# Patient Record
Sex: FEMALE | Race: WHITE | NOT HISPANIC OR LATINO | Employment: OTHER | URBAN - METROPOLITAN AREA
[De-identification: names, ages, dates, MRNs, and addresses within clinical notes are randomized per-mention and may not be internally consistent; named-entity substitution may affect disease eponyms.]

---

## 2018-06-04 ENCOUNTER — HOSPITAL ENCOUNTER (EMERGENCY)
Facility: HOSPITAL | Age: 68
Discharge: HOME | End: 2018-06-04
Payer: MEDICARE

## 2018-06-04 VITALS
HEART RATE: 66 BPM | SYSTOLIC BLOOD PRESSURE: 163 MMHG | TEMPERATURE: 98 F | BODY MASS INDEX: 22.33 KG/M2 | OXYGEN SATURATION: 96 % | RESPIRATION RATE: 18 BRPM | DIASTOLIC BLOOD PRESSURE: 92 MMHG | HEIGHT: 65 IN | WEIGHT: 134 LBS

## 2018-06-04 DIAGNOSIS — F41.9 ANXIETY: ICD-10-CM

## 2018-06-04 DIAGNOSIS — R42 DIZZINESS: Primary | ICD-10-CM

## 2018-06-04 DIAGNOSIS — R51.9 NONINTRACTABLE HEADACHE, UNSPECIFIED CHRONICITY PATTERN, UNSPECIFIED HEADACHE TYPE: ICD-10-CM

## 2018-06-04 DIAGNOSIS — R03.0 ELEVATED BLOOD PRESSURE READING: ICD-10-CM

## 2018-06-04 LAB
BACTERIA #/AREA URNS HPF: NORMAL /HPF
BILIRUB UR QL STRIP: NEGATIVE
CLARITY UR: CLEAR
COLOR UR: YELLOW
GLUCOSE SERPL-MCNC: 79 MG/DL (ref 70–110)
GLUCOSE UR QL STRIP: NEGATIVE
HGB UR QL STRIP: ABNORMAL
KETONES UR QL STRIP: NEGATIVE
LEUKOCYTE ESTERASE UR QL STRIP: NEGATIVE
MICROSCOPIC COMMENT: NORMAL
NITRITE UR QL STRIP: NEGATIVE
PH UR STRIP: 6 [PH] (ref 5–8)
POCT GLUCOSE: 79 MG/DL (ref 70–110)
PROT UR QL STRIP: NEGATIVE
RBC #/AREA URNS HPF: 3 /HPF (ref 0–4)
SP GR UR STRIP: 1.02 (ref 1–1.03)
SQUAMOUS #/AREA URNS HPF: 3 /HPF
URN SPEC COLLECT METH UR: ABNORMAL
UROBILINOGEN UR STRIP-ACNC: NEGATIVE EU/DL
WBC #/AREA URNS HPF: 3 /HPF (ref 0–5)

## 2018-06-04 PROCEDURE — 87086 URINE CULTURE/COLONY COUNT: CPT

## 2018-06-04 PROCEDURE — 99284 EMERGENCY DEPT VISIT MOD MDM: CPT | Mod: 25

## 2018-06-04 PROCEDURE — 93010 ELECTROCARDIOGRAM REPORT: CPT | Mod: ,,, | Performed by: INTERNAL MEDICINE

## 2018-06-04 PROCEDURE — 81000 URINALYSIS NONAUTO W/SCOPE: CPT

## 2018-06-04 RX ORDER — CALCIUM CARBONATE 600 MG
600 TABLET ORAL ONCE
Status: ON HOLD | COMMUNITY
End: 2021-09-28 | Stop reason: SDUPTHER

## 2018-06-04 RX ORDER — LOSARTAN POTASSIUM 50 MG/1
50 TABLET ORAL DAILY
COMMUNITY

## 2018-06-04 RX ORDER — AMLODIPINE BESYLATE 5 MG/1
5 TABLET ORAL DAILY
COMMUNITY

## 2018-06-04 NOTE — ED PROVIDER NOTES
"SCRIBE #1 NOTE: I, Yuejovana Mcgill, am scribing for, and in the presence of, Lilliam Morales PA-C. I have scribed the entire note.      History      Chief Complaint   Patient presents with    Dizziness     Dizziness with HA that started last night.  Recently started amlodipine last month & states dizziness started around that time. Spouse states that pt is not eating like she should.        Review of patient's allergies indicates:   Allergen Reactions    Iodine and iodide containing products         HPI   HPI    6/4/2018, 5:29 PM   History obtained from the patient and spouse      History of Present Illness: Michela Arriaza is a 68 y.o. female patient who presents to the Emergency Department for worsening chronic dizziness which onset gradually a couple months ago after being started on amlodipine. Symptoms are intermittent and moderate in severity. Sxs exacerbated with standing up. No other mitigating or exacerbating factors reported. Associated sxs include HA which onset last night. Pt spouse reports that the pt "does not eat enough". Patient denies any ear pain, fever, chills, head injury/trauma, LOC, neck pain/stiffness, visual disturbance/photophobia, extremity weakness/numbness, SOB, CP, and all other sxs at this time. Pt has an appointment with a Neurologist in 3 weeks. Pt is requesting a CT scan of her head. No further complaints or concerns at this time.         Arrival mode: Personal vehicle    PCP: Yoni Doran MD   [facility]       Past Medical History:  Past Medical History:   Diagnosis Date    Hyperlipidemia          Past Surgical History:  Past surgical history reviewed not relevant      Family History:  Family history reviewed not relevant        Social History:  Social History     Social History Main Topics    Smoking status: Former Smoker    Smokeless tobacco: Unknown    Alcohol use No    Drug use: No    Sexual activity: Yes     Partners: Male       ROS "   Review of Systems   Constitutional: Positive for appetite change. Negative for chills and fever.   HENT: Negative for sore throat.    Eyes: Negative for photophobia and visual disturbance.   Respiratory: Negative for shortness of breath.    Cardiovascular: Negative for chest pain.   Gastrointestinal: Negative for nausea.   Genitourinary: Negative for dysuria.   Musculoskeletal: Negative for back pain, neck pain and neck stiffness.   Skin: Negative for rash.   Neurological: Positive for dizziness and headaches. Negative for weakness and numbness.        (-) head injury/trauma (-) LOC   Hematological: Does not bruise/bleed easily.   All other systems reviewed and are negative.      Physical Exam      Initial Vitals [06/04/18 1650]   BP Pulse Resp Temp SpO2   124/80 74 18 99.3 °F (37.4 °C) 96 %      MAP       94.67          Physical Exam  Nursing Notes and Vital Signs Reviewed.  Constitutional: Patient is in no acute distress. Well-developed and well-nourished.  Head: Atraumatic. Normocephalic.  Eyes: PERRL. EOM intact. Conjunctivae are not pale. No scleral icterus.  ENT: Mucous membranes are moist. Oropharynx is clear and symmetric.    Neck: Supple. Full ROM. No lymphadenopathy.  Cardiovascular: Regular rate. Regular rhythm. No murmurs, rubs, or gallops. Distal pulses are 2+ and symmetric.  Pulmonary/Chest: No respiratory distress. Clear to auscultation bilaterally. No wheezing or rales.  Abdominal: Soft and non-distended.  There is no tenderness.  No rebound, guarding, or rigidity.   Musculoskeletal: Moves all extremities. No obvious deformities. No edema. No calf tenderness.  Skin: Warm and dry.  Neurological: Patient is alert and oriented to person, place and time. Pupils ERRL and EOM normal. Negative Rhomberg's test. Negative Gray-Hallpike test. Cranial nerves II-XII are intact. Strength is full bilaterally; it is equal and 5/5 in bilateral upper and lower extremities. There is no pronator drift of outstretched  "arms. Light touch sense is intact. Speech is clear and normal. No acute focal neurological deficits noted.  Psychiatric: Normal affect. Good eye contact. Appropriate in content. Anxious    ED Course    Procedures  ED Vital Signs:  Vitals:    06/04/18 1650 06/04/18 1840 06/04/18 1842 06/04/18 1846   BP: 124/80 (!) 147/96 (!) 149/90 (!) 163/92   Pulse: 74 61 (!) 59 66   Resp: 18      Temp: 99.3 °F (37.4 °C)      TempSrc: Oral      SpO2: 96%      Weight: 60.8 kg (134 lb)      Height: 5' 5" (1.651 m)       06/04/18 1922   BP:    Pulse:    Resp:    Temp: 98.1 °F (36.7 °C)   TempSrc: Oral   SpO2:    Weight:    Height:        Abnormal Lab Results:  Labs Reviewed   URINALYSIS - Abnormal; Notable for the following:        Result Value    Occult Blood UA 1+ (*)     All other components within normal limits   POCT GLUCOSE MONITORING CONTINUOUS - Normal   CULTURE, URINE   CULTURE, URINE   URINALYSIS MICROSCOPIC   POCT GLUCOSE        All Lab Results:  Results for orders placed or performed during the hospital encounter of 06/04/18   Urinalysis Clean Catch   Result Value Ref Range    Specimen UA Urine, Clean Catch     Color, UA Yellow Yellow, Straw, Divina    Appearance, UA Clear Clear    pH, UA 6.0 5.0 - 8.0    Specific Gravity, UA 1.025 1.005 - 1.030    Protein, UA Negative Negative    Glucose, UA Negative Negative    Ketones, UA Negative Negative    Bilirubin (UA) Negative Negative    Occult Blood UA 1+ (A) Negative    Nitrite, UA Negative Negative    Urobilinogen, UA Negative <2.0 EU/dL    Leukocytes, UA Negative Negative   Urinalysis Microscopic   Result Value Ref Range    RBC, UA 3 0 - 4 /hpf    WBC, UA 3 0 - 5 /hpf    Bacteria, UA None None-Occ /hpf    Squam Epithel, UA 3 /hpf    Microscopic Comment SEE COMMENT    POCT glucose   Result Value Ref Range    POC Glucose 79 70 - 110 mg/dL   POCT glucose   Result Value Ref Range    POCT Glucose 79 70 - 110 mg/dL         Imaging Results:  Imaging Results          CT Head Without " Contrast (Final result)  Result time 06/04/18 18:40:23    Final result by Michel Joy MD (06/04/18 18:40:23)                 Impression:      No acute intracranial findings.  Extensive low-density white matter changes which are nonspecific but may be related to chronic small vessel ischemia.    All CT scans at this facility use dose modulation, iterative reconstruction, and/or weight based dosing when appropriate to reduce radiation dose to as low as reasonably achievable.      Electronically signed by: Michel Joy MD  Date:    06/04/2018  Time:    18:40             Narrative:    EXAMINATION:  CT HEAD WITHOUT CONTRAST    CLINICAL HISTORY:  Dizziness;    FINDINGS:  No intracranial hemorrhage or acute findings are demonstrated.  Extensive low-density changes noted throughout the white matter which may be on the basis of chronic small vessel ischemia.  The visualized paranasal sinuses are clear. The calvarium is intact.                               The EKG was ordered, reviewed, and independently interpreted by the ED provider.  Interpretation time: 1825  Rate: 60 BPM  Rhythm: normal sinus rhythm  Interpretation: Nonspecific ST abnormality. No STEMI.           The Emergency Provider reviewed the vital signs and test results, which are outlined above.    ED Discussion     7:17 PM: Reassessed pt at this time.  Pt is awake, alert, and in NAD at this time. Discussed with pt and spouse all pertinent ED information and results. Discussed pt dx and plan of tx. Gave pt and spouse all f/u and return to the ED instructions. All questions and concerns were addressed at this time. Pt and spouse express understanding of information and instructions, and is comfortable with plan to discharge. Pt is stable for discharge.      I discussed with patient and/or family/caretaker that evaluation in the ED does not suggest any emergent or life threatening medical conditions requiring immediate intervention beyond what was provided in  the ED, and I believe patient is safe for discharge.  Regardless, an unremarkable evaluation in the ED does not preclude the development or presence of a serious of life threatening condition. As such, patient was instructed to return immediately for any worsening or change in current symptoms.    Pre-hypertension/Hypertension: The pt has been informed that they may have pre-hypertension or hypertension based on a blood pressure reading in the ED. I recommend that the pt call the PCP listed on their discharge instructions or a physician of their choice this week to arrange f/u for further evaluation of possible pre-hypertension or hypertension.       ED Medication(s):  Medications - No data to display    Discharge Medication List as of 6/4/2018  7:16 PM          Follow-up Information     Yoni Doran MD In 3 days.    Specialty:  Family Medicine  Contact information:  [address] [facility] [address]  [phone]                     Medical Decision Making    Medical Decision Making:   Clinical Tests:   Lab Tests: Ordered and Reviewed  Radiological Study: Ordered and Reviewed  Medical Tests: Ordered and Reviewed           Scribe Attestation:   Scribe #1: I performed the above scribed service and the documentation accurately describes the services I performed. I attest to the accuracy of the note.    Attending:   Physician Attestation Statement for Scribe #1: I, Lilliam Morales PA-C, personally performed the services described in this documentation, as scribed by Yue Mcgill, in my presence, and it is both accurate and complete.          Clinical Impression       ICD-10-CM ICD-9-CM   1. Dizziness R42 780.4   2. Elevated blood pressure reading R03.0 796.2   3. Nonintractable headache, unspecified chronicity pattern, unspecified headache type R51 784.0   4. Anxiety F41.9 300.00       Disposition:   Disposition: Discharged  Condition: Stable         Lilliam Morales  [place]  06/04/18 1144

## 2018-06-05 ENCOUNTER — TELEPHONE (OUTPATIENT)
Dept: NEUROLOGY | Facility: CLINIC | Age: 68
End: 2018-06-05

## 2018-06-05 NOTE — TELEPHONE ENCOUNTER
----- Message from Kenya Man sent at 6/5/2018 12:04 PM CDT -----  Contact: Hank/son  Np needs to come in for an ER follow up, Please call him at [phone].    Thanks  Td

## 2018-06-05 NOTE — TELEPHONE ENCOUNTER
----- Message from Chiquita Lyn sent at 6/5/2018  3:33 PM CDT -----  Contact: Hank son  Returning your call. (Danielle) concerning getting an appointment sooner than July. Please call patient @ [phone]. Thanks, pa

## 2018-06-05 NOTE — ED NOTES
Patient increasingly more disorganized and restless, requires frequent redirection, wandering the ED.

## 2018-06-06 LAB — BACTERIA UR CULT: NORMAL

## 2019-05-13 ENCOUNTER — HOSPITAL ENCOUNTER (EMERGENCY)
Facility: HOSPITAL | Age: 69
Discharge: HOME | End: 2019-05-13
Attending: EMERGENCY MEDICINE
Payer: MEDICARE

## 2019-05-13 VITALS
BODY MASS INDEX: 22.3 KG/M2 | SYSTOLIC BLOOD PRESSURE: 145 MMHG | HEIGHT: 65 IN | OXYGEN SATURATION: 96 % | TEMPERATURE: 98 F | DIASTOLIC BLOOD PRESSURE: 73 MMHG | RESPIRATION RATE: 18 BRPM | HEART RATE: 67 BPM | WEIGHT: 133.81 LBS

## 2019-05-13 DIAGNOSIS — K59.00 CONSTIPATION, UNSPECIFIED CONSTIPATION TYPE: Primary | ICD-10-CM

## 2019-05-13 DIAGNOSIS — K56.41 FECAL IMPACTION IN RECTUM: ICD-10-CM

## 2019-05-13 PROCEDURE — 99283 EMERGENCY DEPT VISIT LOW MDM: CPT

## 2019-05-13 RX ORDER — POLYETHYLENE GLYCOL 3350 17 G/17G
17 POWDER, FOR SOLUTION ORAL DAILY
Qty: 289 G | Refills: 0 | Status: SHIPPED | OUTPATIENT
Start: 2019-05-13

## 2019-05-13 NOTE — ED PROVIDER NOTES
SCRIBE #1 NOTE: I, Luisa Vega, am scribing for, and in the presence of, Nicola Hernández NP. I have scribed the entire note.       History     Chief Complaint   Patient presents with    Diarrhea     Pt reports hard stool x2-3 days and it has now turned very soft and is having pain with BM.     Review of patient's allergies indicates:   Allergen Reactions    Iodine and iodide containing products          History of Present Illness     HPI    5/13/2019, 2:35 PM  History obtained from the patient      History of Present Illness: Michela Arriaza is a 69 y.o. female patient with a PMHx of HLD and HTN who presents to the Emergency Department for evaluation of rectal pain which onset gradually yesterday. Pt states her last normal BM was a week ago. She reports minimal hard stool yesterday. Pt is unsure if she is constipated. Symptoms are constant and moderate in severity. No mitigating or exacerbating factors reported. No associated sxs included. Patient denies any fever, chills, abd pain, hematochezia, n/v/d, bowel incontinence, dysuria, urinary frequency/urgency, hematuria, dizziness, and all other sxs at this time. No prior Tx. No further complaints or concerns at this time.       Arrival mode: Personal vehicle    PCP: Anatoliy Mayberry MD        Past Medical History:  Past Medical History:   Diagnosis Date    Hyperlipidemia     Hypertension        Past Surgical History:  History reviewed. No pertinent surgical history.      Family History:  History reviewed. No pertinent family history.    Social History:  Social History     Tobacco Use    Smoking status: Former Smoker   Substance and Sexual Activity    Alcohol use: No     Alcohol/week: 0.0 oz    Drug use: No    Sexual activity: Yes     Partners: Male        Review of Systems     Review of Systems   Constitutional: Negative for chills and fever.   HENT: Negative for rhinorrhea and sore throat.    Respiratory: Negative for cough and shortness of breath.     Cardiovascular: Negative for chest pain.   Gastrointestinal: Positive for rectal pain. Negative for abdominal pain, blood in stool, diarrhea, nausea and vomiting.        (-) bowel incontinence   Genitourinary: Negative for dysuria, frequency, hematuria and urgency.   Musculoskeletal: Negative for back pain.   Skin: Negative for rash.   Neurological: Negative for dizziness, weakness and numbness.   All other systems reviewed and are negative.       Physical Exam     Initial Vitals [05/13/19 1412]   BP Pulse Resp Temp SpO2   (!) 145/73 67 18 97.9 °F (36.6 °C) 96 %      MAP       --          Physical Exam  Nursing Notes and Vital Signs Reviewed.  Constitutional: Patient is in no acute distress. Well-developed and well-nourished.  Head: Atraumatic. Normocephalic.  Eyes: PERRL. EOM intact. Conjunctivae are not pale. No scleral icterus.  ENT: Mucous membranes are moist. Oropharynx is clear and symmetric.    Neck: Supple. Full ROM. No lymphadenopathy.  Cardiovascular: Regular rate. Regular rhythm. No murmurs, rubs, or gallops. Distal pulses are 2+ and symmetric.  Pulmonary/Chest: No respiratory distress. Clear to auscultation bilaterally. No wheezing or rales.  Abdominal: Soft and non-distended.  There is no tenderness.  No rebound, guarding, or rigidity. Good bowel sounds.  Rectal: Female chaperone present for the duration of the rectal exam. There is no tenderness. No masses or hemorrhoids. Normal sphincter tone. Fecal impaction high in the rectal vault. Pt was manually disimpacted.  Musculoskeletal: Moves all extremities. No obvious deformities. No edema. No calf tenderness.  Skin: Warm and dry.  Neurological:  Alert, awake, and appropriate.  Normal speech.  No acute focal neurological deficits are appreciated.  Psychiatric: Normal affect. Good eye contact. Appropriate in content.     ED Course   Procedures  ED Vital Signs:  Vitals:    05/13/19 1412   BP: (!) 145/73   Pulse: 67   Resp: 18   Temp: 97.9 °F (36.6 °C)  "  TempSrc: Oral   SpO2: 96%   Weight: 60.7 kg (133 lb 13.1 oz)   Height: 5' 5" (1.651 m)       Abnormal Lab Results:  Labs Reviewed - No data to display       Imaging Results:  Imaging Results    None                 The Emergency Provider reviewed the vital signs and test results, which are outlined above.     ED Discussion     3:47 PM: Discussed with pt all pertinent ED information and results. Discussed pt dx and plan of tx. Gave pt all f/u and return to the ED instructions. All questions and concerns were addressed at this time. Pt expresses understanding of information and instructions, and is comfortable with plan to discharge. Pt is stable for discharge.    I discussed with patient and/or family/caretaker that evaluation in the ED does not suggest any emergent or life threatening medical conditions requiring immediate intervention beyond what was provided in the ED, and I believe patient is safe for discharge.  Regardless, an unremarkable evaluation in the ED does not preclude the development or presence of a serious of life threatening condition. As such, patient was instructed to return immediately for any worsening or change in current symptoms.      ED Medication(s):  Medications - No data to display    New Prescriptions    POLYETHYLENE GLYCOL (GLYCOLAX) 17 GRAM/DOSE POWDER    Take 17 g by mouth once daily.       Follow-up Information     Anatoliy Mayberry MD. Schedule an appointment as soon as possible for a visit in 2 days.    Specialty:  Family Medicine  Contact information:  [address] [facility] [address] [phone]                                     Scribe Attestation:   Scribe #1: I performed the above scribed service and the documentation accurately describes the services I performed. I attest to the accuracy of the note.     Attending:   Physician Attestation Statement for Scribe #1: I, Nicola Hernández NP, personally performed the services described in this documentation, as scribed by " Luisa Vega, in my presence, and it is both accurate and complete.           Clinical Impression       ICD-10-CM ICD-9-CM   1. Constipation, unspecified constipation type K59.00 564.00   2. Fecal impaction in rectum K56.41 560.32       Disposition:   Disposition: Discharged  Condition: Stable         Nicola Hernández NP  05/13/19 2708

## 2019-05-13 NOTE — ED NOTES
Bed: 22  Expected date:   Expected time:   Means of arrival:   Comments:  Dirty. devin when clean

## 2021-09-18 ENCOUNTER — HOSPITAL ENCOUNTER (EMERGENCY)
Facility: HOSPITAL | Age: 71
Discharge: HOME | End: 2021-09-19
Attending: EMERGENCY MEDICINE
Payer: MEDICARE

## 2021-09-18 VITALS
OXYGEN SATURATION: 98 % | TEMPERATURE: 99 F | SYSTOLIC BLOOD PRESSURE: 160 MMHG | DIASTOLIC BLOOD PRESSURE: 90 MMHG | BODY MASS INDEX: 24.99 KG/M2 | RESPIRATION RATE: 16 BRPM | HEIGHT: 65 IN | WEIGHT: 150 LBS | HEART RATE: 69 BPM

## 2021-09-18 DIAGNOSIS — N39.0 URINARY TRACT INFECTION WITHOUT HEMATURIA, SITE UNSPECIFIED: Primary | ICD-10-CM

## 2021-09-18 DIAGNOSIS — K57.90 DIVERTICULOSIS: ICD-10-CM

## 2021-09-18 DIAGNOSIS — F03.90 DEMENTIA WITHOUT BEHAVIORAL DISTURBANCE, UNSPECIFIED DEMENTIA TYPE: ICD-10-CM

## 2021-09-18 PROBLEM — R55 VASOVAGAL SYNCOPE: Status: ACTIVE | Noted: 2021-09-18

## 2021-09-18 LAB
ALBUMIN SERPL BCP-MCNC: 3.4 G/DL (ref 3.5–5.2)
ALP SERPL-CCNC: 95 U/L (ref 55–135)
ALT SERPL W/O P-5'-P-CCNC: 16 U/L (ref 10–44)
ANION GAP SERPL CALC-SCNC: 11 MMOL/L (ref 8–16)
AST SERPL-CCNC: 27 U/L (ref 10–40)
BACTERIA #/AREA URNS HPF: ABNORMAL /HPF
BASOPHILS # BLD AUTO: 0.02 K/UL (ref 0–0.2)
BASOPHILS NFR BLD: 0.3 % (ref 0–1.9)
BILIRUB SERPL-MCNC: 0.5 MG/DL (ref 0.1–1)
BILIRUB UR QL STRIP: NEGATIVE
BUN SERPL-MCNC: 15 MG/DL (ref 8–23)
CALCIUM SERPL-MCNC: 9.6 MG/DL (ref 8.7–10.5)
CHLORIDE SERPL-SCNC: 108 MMOL/L (ref 95–110)
CLARITY UR: CLEAR
CO2 SERPL-SCNC: 26 MMOL/L (ref 23–29)
COLOR UR: YELLOW
CREAT SERPL-MCNC: 0.9 MG/DL (ref 0.5–1.4)
DIFFERENTIAL METHOD: NORMAL
EOSINOPHIL # BLD AUTO: 0.2 K/UL (ref 0–0.5)
EOSINOPHIL NFR BLD: 2.3 % (ref 0–8)
ERYTHROCYTE [DISTWIDTH] IN BLOOD BY AUTOMATED COUNT: 13.2 % (ref 11.5–14.5)
EST. GFR  (AFRICAN AMERICAN): >60 ML/MIN/1.73 M^2
EST. GFR  (NON AFRICAN AMERICAN): >60 ML/MIN/1.73 M^2
GLUCOSE SERPL-MCNC: 102 MG/DL (ref 70–110)
GLUCOSE UR QL STRIP: NEGATIVE
HCT VFR BLD AUTO: 42.8 % (ref 37–48.5)
HCV AB SERPL QL IA: NEGATIVE
HGB BLD-MCNC: 14 G/DL (ref 12–16)
HGB UR QL STRIP: ABNORMAL
IMM GRANULOCYTES # BLD AUTO: 0.01 K/UL (ref 0–0.04)
IMM GRANULOCYTES NFR BLD AUTO: 0.2 % (ref 0–0.5)
KETONES UR QL STRIP: NEGATIVE
LEUKOCYTE ESTERASE UR QL STRIP: ABNORMAL
LYMPHOCYTES # BLD AUTO: 1.9 K/UL (ref 1–4.8)
LYMPHOCYTES NFR BLD: 29.6 % (ref 18–48)
MCH RBC QN AUTO: 28.1 PG (ref 27–31)
MCHC RBC AUTO-ENTMCNC: 32.7 G/DL (ref 32–36)
MCV RBC AUTO: 86 FL (ref 82–98)
MICROSCOPIC COMMENT: ABNORMAL
MONOCYTES # BLD AUTO: 0.7 K/UL (ref 0.3–1)
MONOCYTES NFR BLD: 10.4 % (ref 4–15)
NEUTROPHILS # BLD AUTO: 3.7 K/UL (ref 1.8–7.7)
NEUTROPHILS NFR BLD: 57.2 % (ref 38–73)
NITRITE UR QL STRIP: POSITIVE
NRBC BLD-RTO: 0 /100 WBC
PH UR STRIP: 6 [PH] (ref 5–8)
PLATELET # BLD AUTO: 178 K/UL (ref 150–450)
PMV BLD AUTO: 10.4 FL (ref 9.2–12.9)
POTASSIUM SERPL-SCNC: 3.9 MMOL/L (ref 3.5–5.1)
PROT SERPL-MCNC: 6.6 G/DL (ref 6–8.4)
PROT UR QL STRIP: NEGATIVE
RBC # BLD AUTO: 4.98 M/UL (ref 4–5.4)
RBC #/AREA URNS HPF: 0 /HPF (ref 0–4)
SODIUM SERPL-SCNC: 145 MMOL/L (ref 136–145)
SP GR UR STRIP: 1.02 (ref 1–1.03)
URN SPEC COLLECT METH UR: ABNORMAL
UROBILINOGEN UR STRIP-ACNC: NEGATIVE EU/DL
WBC # BLD AUTO: 6.53 K/UL (ref 3.9–12.7)
WBC #/AREA URNS HPF: 5 /HPF (ref 0–5)

## 2021-09-18 PROCEDURE — 81000 URINALYSIS NONAUTO W/SCOPE: CPT | Performed by: NURSE PRACTITIONER

## 2021-09-18 PROCEDURE — 85025 COMPLETE CBC W/AUTO DIFF WBC: CPT | Performed by: NURSE PRACTITIONER

## 2021-09-18 PROCEDURE — 80053 COMPREHEN METABOLIC PANEL: CPT | Performed by: NURSE PRACTITIONER

## 2021-09-18 PROCEDURE — 86803 HEPATITIS C AB TEST: CPT | Performed by: FAMILY MEDICINE

## 2021-09-18 PROCEDURE — 99284 EMERGENCY DEPT VISIT MOD MDM: CPT | Mod: 25

## 2021-09-18 PROCEDURE — 96372 THER/PROPH/DIAG INJ SC/IM: CPT

## 2021-09-18 PROCEDURE — 63600175 PHARM REV CODE 636 W HCPCS: Performed by: EMERGENCY MEDICINE

## 2021-09-18 RX ORDER — ZIPRASIDONE MESYLATE 20 MG/ML
10 INJECTION, POWDER, LYOPHILIZED, FOR SOLUTION INTRAMUSCULAR
Status: COMPLETED | OUTPATIENT
Start: 2021-09-18 | End: 2021-09-18

## 2021-09-18 RX ADMIN — ZIPRASIDONE MESYLATE 10 MG: 20 INJECTION, POWDER, LYOPHILIZED, FOR SOLUTION INTRAMUSCULAR at 09:09

## 2021-09-19 VITALS
DIASTOLIC BLOOD PRESSURE: 93 MMHG | RESPIRATION RATE: 16 BRPM | OXYGEN SATURATION: 95 % | TEMPERATURE: 98 F | HEART RATE: 93 BPM | SYSTOLIC BLOOD PRESSURE: 181 MMHG
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
## 2021-09-19 DIAGNOSIS — S32.591A CLOSED FRACTURE OF RAMUS OF RIGHT PUBIS, INITIAL ENCOUNTER: Primary | ICD-10-CM

## 2021-09-19 PROCEDURE — 99283 EMERGENCY DEPT VISIT LOW MDM: CPT | Mod: 25

## 2021-09-19 PROCEDURE — 25000003 PHARM REV CODE 250: Performed by: EMERGENCY MEDICINE

## 2021-09-19 RX ORDER — DOCUSATE SODIUM 100 MG/1
100 CAPSULE, LIQUID FILLED ORAL 3 TIMES DAILY PRN
Qty: 60 CAPSULE | Refills: 0 | Status: SHIPPED | OUTPATIENT
Start: 2021-09-19

## 2021-09-19 RX ORDER — IBUPROFEN 400 MG/1
400 TABLET ORAL
Status: DISCONTINUED | OUTPATIENT
Start: 2021-09-19 | End: 2021-09-19 | Stop reason: HOSPADM

## 2021-09-19 RX ORDER — HYDROCODONE BITARTRATE AND ACETAMINOPHEN 5; 325 MG/1; MG/1
1 TABLET ORAL EVERY 8 HOURS PRN
Qty: 8 TABLET | Refills: 0 | Status: ON HOLD | OUTPATIENT
Start: 2021-09-19 | End: 2021-09-28 | Stop reason: SDUPTHER

## 2021-09-19 RX ORDER — ACETAMINOPHEN 325 MG/1
650 TABLET ORAL
Status: DISCONTINUED | OUTPATIENT
Start: 2021-09-19 | End: 2021-09-19 | Stop reason: HOSPADM

## 2021-09-19 RX ORDER — NITROFURANTOIN 25; 75 MG/1; MG/1
100 CAPSULE ORAL 2 TIMES DAILY
Qty: 10 CAPSULE | Refills: 0 | Status: ON HOLD | OUTPATIENT
Start: 2021-09-19 | End: 2021-09-28 | Stop reason: HOSPADM

## 2021-09-22 ENCOUNTER — HOSPITAL ENCOUNTER (EMERGENCY)
Facility: HOSPITAL | Age: 71
Discharge: HOME | End: 2021-09-23
Attending: FAMILY MEDICINE
Payer: MEDICARE

## 2021-09-22 VITALS
DIASTOLIC BLOOD PRESSURE: 90 MMHG | HEART RATE: 102 BPM | OXYGEN SATURATION: 95 % | RESPIRATION RATE: 20 BRPM | SYSTOLIC BLOOD PRESSURE: 146 MMHG

## 2021-09-22 DIAGNOSIS — R05.9 COUGH: ICD-10-CM

## 2021-09-22 DIAGNOSIS — N30.00 ACUTE CYSTITIS WITHOUT HEMATURIA: Primary | ICD-10-CM

## 2021-09-22 LAB
ALBUMIN SERPL BCP-MCNC: 3 G/DL (ref 3.5–5.2)
ALP SERPL-CCNC: 103 U/L (ref 55–135)
ALT SERPL W/O P-5'-P-CCNC: 15 U/L (ref 10–44)
ANION GAP SERPL CALC-SCNC: 12 MMOL/L (ref 8–16)
AST SERPL-CCNC: 21 U/L (ref 10–40)
BACTERIA #/AREA URNS HPF: NORMAL /HPF
BASOPHILS # BLD AUTO: 0.05 K/UL (ref 0–0.2)
BASOPHILS NFR BLD: 0.5 % (ref 0–1.9)
BILIRUB SERPL-MCNC: 1.1 MG/DL (ref 0.1–1)
BILIRUB UR QL STRIP: NEGATIVE
BUN SERPL-MCNC: 14 MG/DL (ref 8–23)
CALCIUM SERPL-MCNC: 9.3 MG/DL (ref 8.7–10.5)
CHLORIDE SERPL-SCNC: 106 MMOL/L (ref 95–110)
CLARITY UR: CLEAR
CO2 SERPL-SCNC: 24 MMOL/L (ref 23–29)
COLOR UR: YELLOW
CREAT SERPL-MCNC: 0.8 MG/DL (ref 0.5–1.4)
CTP QC/QA: YES
DIFFERENTIAL METHOD: ABNORMAL
EOSINOPHIL # BLD AUTO: 0.2 K/UL (ref 0–0.5)
EOSINOPHIL NFR BLD: 2.1 % (ref 0–8)
ERYTHROCYTE [DISTWIDTH] IN BLOOD BY AUTOMATED COUNT: 12.9 % (ref 11.5–14.5)
EST. GFR  (AFRICAN AMERICAN): >60 ML/MIN/1.73 M^2
EST. GFR  (NON AFRICAN AMERICAN): >60 ML/MIN/1.73 M^2
GLUCOSE SERPL-MCNC: 114 MG/DL (ref 70–110)
GLUCOSE UR QL STRIP: NEGATIVE
HCT VFR BLD AUTO: 43 % (ref 37–48.5)
HGB BLD-MCNC: 14.2 G/DL (ref 12–16)
HGB UR QL STRIP: ABNORMAL
HYALINE CASTS #/AREA URNS LPF: 0 /LPF
IMM GRANULOCYTES # BLD AUTO: 0.05 K/UL (ref 0–0.04)
IMM GRANULOCYTES NFR BLD AUTO: 0.5 % (ref 0–0.5)
KETONES UR QL STRIP: NEGATIVE
LEUKOCYTE ESTERASE UR QL STRIP: ABNORMAL
LYMPHOCYTES # BLD AUTO: 1.4 K/UL (ref 1–4.8)
LYMPHOCYTES NFR BLD: 14.5 % (ref 18–48)
MCH RBC QN AUTO: 27.8 PG (ref 27–31)
MCHC RBC AUTO-ENTMCNC: 33 G/DL (ref 32–36)
MCV RBC AUTO: 84 FL (ref 82–98)
MICROSCOPIC COMMENT: NORMAL
MONOCYTES # BLD AUTO: 1 K/UL (ref 0.3–1)
MONOCYTES NFR BLD: 10.9 % (ref 4–15)
NEUTROPHILS # BLD AUTO: 6.7 K/UL (ref 1.8–7.7)
NEUTROPHILS NFR BLD: 71.5 % (ref 38–73)
NITRITE UR QL STRIP: POSITIVE
NRBC BLD-RTO: 0 /100 WBC
PH UR STRIP: 6 [PH] (ref 5–8)
PLATELET # BLD AUTO: 150 K/UL (ref 150–450)
PMV BLD AUTO: 10.3 FL (ref 9.2–12.9)
POTASSIUM SERPL-SCNC: 3.7 MMOL/L (ref 3.5–5.1)
PROT SERPL-MCNC: 6.6 G/DL (ref 6–8.4)
PROT UR QL STRIP: ABNORMAL
RBC # BLD AUTO: 5.1 M/UL (ref 4–5.4)
RBC #/AREA URNS HPF: 0 /HPF (ref 0–4)
SARS-COV-2 RDRP RESP QL NAA+PROBE: NEGATIVE
SODIUM SERPL-SCNC: 142 MMOL/L (ref 136–145)
SP GR UR STRIP: >=1.03 (ref 1–1.03)
URN SPEC COLLECT METH UR: ABNORMAL
UROBILINOGEN UR STRIP-ACNC: NEGATIVE EU/DL
WBC # BLD AUTO: 9.41 K/UL (ref 3.9–12.7)
WBC #/AREA URNS HPF: 0 /HPF (ref 0–5)

## 2021-09-22 PROCEDURE — U0002 COVID-19 LAB TEST NON-CDC: HCPCS | Performed by: FAMILY MEDICINE

## 2021-09-22 PROCEDURE — 81000 URINALYSIS NONAUTO W/SCOPE: CPT | Performed by: FAMILY MEDICINE

## 2021-09-22 PROCEDURE — 99284 EMERGENCY DEPT VISIT MOD MDM: CPT | Mod: 25

## 2021-09-22 PROCEDURE — 96365 THER/PROPH/DIAG IV INF INIT: CPT

## 2021-09-22 PROCEDURE — 85025 COMPLETE CBC W/AUTO DIFF WBC: CPT | Performed by: FAMILY MEDICINE

## 2021-09-22 PROCEDURE — 36415 COLL VENOUS BLD VENIPUNCTURE: CPT | Performed by: FAMILY MEDICINE

## 2021-09-22 PROCEDURE — 80053 COMPREHEN METABOLIC PANEL: CPT | Performed by: FAMILY MEDICINE

## 2021-09-22 PROCEDURE — 63600175 PHARM REV CODE 636 W HCPCS: Performed by: FAMILY MEDICINE

## 2021-09-22 RX ADMIN — CEFTRIAXONE 1 G: 1 INJECTION, SOLUTION INTRAVENOUS at 05:09

## 2021-09-23 ENCOUNTER — TELEPHONE (OUTPATIENT)
Dept: EMERGENCY MEDICINE | Facility: HOSPITAL | Age: 71
End: 2021-09-23

## 2021-09-23 DIAGNOSIS — S32.591A CLOSED FRACTURE OF RAMUS OF RIGHT PUBIS, INITIAL ENCOUNTER: Primary | ICD-10-CM

## 2021-09-24 ENCOUNTER — HOSPITAL ENCOUNTER (OUTPATIENT)
Facility: HOSPITAL | Age: 71
Discharge: NURSING, REHAB OR LONG-TERM CARE | End: 2021-09-28
Attending: EMERGENCY MEDICINE | Admitting: FAMILY MEDICINE
Payer: MEDICARE

## 2021-09-24 DIAGNOSIS — S32.591D CLOSED FRACTURE OF RAMUS OF RIGHT PUBIS WITH ROUTINE HEALING, SUBSEQUENT ENCOUNTER: ICD-10-CM

## 2021-09-24 DIAGNOSIS — F03.90 DEMENTIA WITHOUT BEHAVIORAL DISTURBANCE, UNSPECIFIED DEMENTIA TYPE: ICD-10-CM

## 2021-09-24 DIAGNOSIS — R53.1 WEAKNESS: ICD-10-CM

## 2021-09-24 DIAGNOSIS — N30.01 ACUTE CYSTITIS WITH HEMATURIA: Primary | ICD-10-CM

## 2021-09-24 DIAGNOSIS — S32.591D CLOSED FRACTURE OF RIGHT INFERIOR PUBIC RAMUS, WITH ROUTINE HEALING, SUBSEQUENT ENCOUNTER: ICD-10-CM

## 2021-09-24 PROBLEM — R73.9 HYPERGLYCEMIA: Status: ACTIVE | Noted: 2021-09-24

## 2021-09-24 LAB
ALBUMIN SERPL BCP-MCNC: 2.9 G/DL (ref 3.5–5.2)
ALP SERPL-CCNC: 109 U/L (ref 55–135)
ALT SERPL W/O P-5'-P-CCNC: 14 U/L (ref 10–44)
ANION GAP SERPL CALC-SCNC: 13 MMOL/L (ref 8–16)
AST SERPL-CCNC: 18 U/L (ref 10–40)
BACTERIA #/AREA URNS HPF: ABNORMAL /HPF
BASOPHILS # BLD AUTO: 0.04 K/UL (ref 0–0.2)
BASOPHILS NFR BLD: 0.4 % (ref 0–1.9)
BILIRUB SERPL-MCNC: 0.9 MG/DL (ref 0.1–1)
BILIRUB UR QL STRIP: NEGATIVE
BNP SERPL-MCNC: 36 PG/ML (ref 0–99)
BUN SERPL-MCNC: 20 MG/DL (ref 8–23)
CALCIUM SERPL-MCNC: 9.7 MG/DL (ref 8.7–10.5)
CHLORIDE SERPL-SCNC: 106 MMOL/L (ref 95–110)
CK SERPL-CCNC: 47 U/L (ref 20–180)
CLARITY UR: CLEAR
CO2 SERPL-SCNC: 23 MMOL/L (ref 23–29)
COLOR UR: YELLOW
CREAT SERPL-MCNC: 0.8 MG/DL (ref 0.5–1.4)
CTP QC/QA: YES
DIFFERENTIAL METHOD: ABNORMAL
EOSINOPHIL # BLD AUTO: 0.2 K/UL (ref 0–0.5)
EOSINOPHIL NFR BLD: 1.8 % (ref 0–8)
ERYTHROCYTE [DISTWIDTH] IN BLOOD BY AUTOMATED COUNT: 12.9 % (ref 11.5–14.5)
EST. GFR  (AFRICAN AMERICAN): >60 ML/MIN/1.73 M^2
EST. GFR  (NON AFRICAN AMERICAN): >60 ML/MIN/1.73 M^2
GLUCOSE SERPL-MCNC: 143 MG/DL (ref 70–110)
GLUCOSE UR QL STRIP: NEGATIVE
HCT VFR BLD AUTO: 44.4 % (ref 37–48.5)
HGB BLD-MCNC: 14.3 G/DL (ref 12–16)
HGB UR QL STRIP: ABNORMAL
IMM GRANULOCYTES # BLD AUTO: 0.05 K/UL (ref 0–0.04)
IMM GRANULOCYTES NFR BLD AUTO: 0.5 % (ref 0–0.5)
KETONES UR QL STRIP: NEGATIVE
LACTATE SERPL-SCNC: 2.2 MMOL/L (ref 0.5–2.2)
LEUKOCYTE ESTERASE UR QL STRIP: ABNORMAL
LYMPHOCYTES # BLD AUTO: 1 K/UL (ref 1–4.8)
LYMPHOCYTES NFR BLD: 9.8 % (ref 18–48)
MCH RBC QN AUTO: 27.6 PG (ref 27–31)
MCHC RBC AUTO-ENTMCNC: 32.2 G/DL (ref 32–36)
MCV RBC AUTO: 86 FL (ref 82–98)
MICROSCOPIC COMMENT: ABNORMAL
MONOCYTES # BLD AUTO: 1.2 K/UL (ref 0.3–1)
MONOCYTES NFR BLD: 12.3 % (ref 4–15)
NEUTROPHILS # BLD AUTO: 7.3 K/UL (ref 1.8–7.7)
NEUTROPHILS NFR BLD: 75.2 % (ref 38–73)
NITRITE UR QL STRIP: POSITIVE
NRBC BLD-RTO: 0 /100 WBC
PH UR STRIP: 6 [PH] (ref 5–8)
PLATELET # BLD AUTO: 183 K/UL (ref 150–450)
PMV BLD AUTO: 10.1 FL (ref 9.2–12.9)
POCT GLUCOSE: 210 MG/DL (ref 70–110)
POTASSIUM SERPL-SCNC: 3.7 MMOL/L (ref 3.5–5.1)
PROT SERPL-MCNC: 7 G/DL (ref 6–8.4)
PROT UR QL STRIP: ABNORMAL
RBC # BLD AUTO: 5.18 M/UL (ref 4–5.4)
RBC #/AREA URNS HPF: 1 /HPF (ref 0–4)
SARS-COV-2 RDRP RESP QL NAA+PROBE: NEGATIVE
SODIUM SERPL-SCNC: 142 MMOL/L (ref 136–145)
SP GR UR STRIP: >=1.03 (ref 1–1.03)
SQUAMOUS #/AREA URNS HPF: 1 /HPF
TROPONIN I SERPL DL<=0.01 NG/ML-MCNC: <0.006 NG/ML (ref 0–0.03)
URN SPEC COLLECT METH UR: ABNORMAL
UROBILINOGEN UR STRIP-ACNC: NEGATIVE EU/DL
WBC # BLD AUTO: 9.75 K/UL (ref 3.9–12.7)
WBC #/AREA URNS HPF: >100 /HPF (ref 0–5)

## 2021-09-24 PROCEDURE — 96365 THER/PROPH/DIAG IV INF INIT: CPT

## 2021-09-24 PROCEDURE — 85025 COMPLETE CBC W/AUTO DIFF WBC: CPT | Performed by: EMERGENCY MEDICINE

## 2021-09-24 PROCEDURE — 25000003 PHARM REV CODE 250: Performed by: NURSE PRACTITIONER

## 2021-09-24 PROCEDURE — 93010 EKG 12-LEAD: ICD-10-PCS | Mod: ,,, | Performed by: INTERNAL MEDICINE

## 2021-09-24 PROCEDURE — 93010 ELECTROCARDIOGRAM REPORT: CPT | Mod: ,,, | Performed by: INTERNAL MEDICINE

## 2021-09-24 PROCEDURE — 96361 HYDRATE IV INFUSION ADD-ON: CPT

## 2021-09-24 PROCEDURE — 83605 ASSAY OF LACTIC ACID: CPT | Performed by: NURSE PRACTITIONER

## 2021-09-24 PROCEDURE — 63600175 PHARM REV CODE 636 W HCPCS: Performed by: EMERGENCY MEDICINE

## 2021-09-24 PROCEDURE — 87086 URINE CULTURE/COLONY COUNT: CPT | Performed by: EMERGENCY MEDICINE

## 2021-09-24 PROCEDURE — 63600175 PHARM REV CODE 636 W HCPCS: Performed by: NURSE PRACTITIONER

## 2021-09-24 PROCEDURE — 93005 ELECTROCARDIOGRAM TRACING: CPT

## 2021-09-24 PROCEDURE — 99285 EMERGENCY DEPT VISIT HI MDM: CPT | Mod: 25

## 2021-09-24 PROCEDURE — 82550 ASSAY OF CK (CPK): CPT | Performed by: EMERGENCY MEDICINE

## 2021-09-24 PROCEDURE — 81000 URINALYSIS NONAUTO W/SCOPE: CPT | Performed by: EMERGENCY MEDICINE

## 2021-09-24 PROCEDURE — 96372 THER/PROPH/DIAG INJ SC/IM: CPT | Mod: 59

## 2021-09-24 PROCEDURE — G0378 HOSPITAL OBSERVATION PER HR: HCPCS

## 2021-09-24 PROCEDURE — 25000003 PHARM REV CODE 250: Performed by: EMERGENCY MEDICINE

## 2021-09-24 PROCEDURE — 84484 ASSAY OF TROPONIN QUANT: CPT | Performed by: EMERGENCY MEDICINE

## 2021-09-24 PROCEDURE — 83880 ASSAY OF NATRIURETIC PEPTIDE: CPT | Performed by: EMERGENCY MEDICINE

## 2021-09-24 PROCEDURE — 80053 COMPREHEN METABOLIC PANEL: CPT | Performed by: EMERGENCY MEDICINE

## 2021-09-24 PROCEDURE — U0002 COVID-19 LAB TEST NON-CDC: HCPCS | Performed by: EMERGENCY MEDICINE

## 2021-09-24 RX ORDER — ALPRAZOLAM 0.25 MG/1
0.25 TABLET ORAL 3 TIMES DAILY PRN
Status: DISCONTINUED | OUTPATIENT
Start: 2021-09-24 | End: 2021-09-28 | Stop reason: HOSPADM

## 2021-09-24 RX ORDER — TALC
6 POWDER (GRAM) TOPICAL NIGHTLY PRN
Status: DISCONTINUED | OUTPATIENT
Start: 2021-09-24 | End: 2021-09-28 | Stop reason: HOSPADM

## 2021-09-24 RX ORDER — ONDANSETRON 2 MG/ML
4 INJECTION INTRAMUSCULAR; INTRAVENOUS EVERY 8 HOURS PRN
Status: DISCONTINUED | OUTPATIENT
Start: 2021-09-24 | End: 2021-09-28 | Stop reason: HOSPADM

## 2021-09-24 RX ORDER — ENOXAPARIN SODIUM 100 MG/ML
40 INJECTION SUBCUTANEOUS EVERY 24 HOURS
Status: DISCONTINUED | OUTPATIENT
Start: 2021-09-24 | End: 2021-09-28 | Stop reason: HOSPADM

## 2021-09-24 RX ORDER — SODIUM CHLORIDE 0.9 % (FLUSH) 0.9 %
10 SYRINGE (ML) INJECTION
Status: DISCONTINUED | OUTPATIENT
Start: 2021-09-24 | End: 2021-09-28 | Stop reason: HOSPADM

## 2021-09-24 RX ORDER — ACETAMINOPHEN 325 MG/1
650 TABLET ORAL EVERY 4 HOURS PRN
Status: DISCONTINUED | OUTPATIENT
Start: 2021-09-24 | End: 2021-09-28 | Stop reason: HOSPADM

## 2021-09-24 RX ORDER — SODIUM CHLORIDE 9 MG/ML
INJECTION, SOLUTION INTRAVENOUS CONTINUOUS
Status: ACTIVE | OUTPATIENT
Start: 2021-09-24 | End: 2021-09-25

## 2021-09-24 RX ADMIN — SODIUM CHLORIDE: 0.9 INJECTION, SOLUTION INTRAVENOUS at 06:09

## 2021-09-24 RX ADMIN — ENOXAPARIN SODIUM 40 MG: 40 INJECTION SUBCUTANEOUS at 06:09

## 2021-09-24 RX ADMIN — CEFTRIAXONE 1 G: 1 INJECTION, POWDER, FOR SOLUTION INTRAMUSCULAR; INTRAVENOUS at 01:09

## 2021-09-25 LAB
ALBUMIN SERPL BCP-MCNC: 2.5 G/DL (ref 3.5–5.2)
ALP SERPL-CCNC: 98 U/L (ref 55–135)
ALT SERPL W/O P-5'-P-CCNC: 14 U/L (ref 10–44)
ANION GAP SERPL CALC-SCNC: 11 MMOL/L (ref 8–16)
AST SERPL-CCNC: 17 U/L (ref 10–40)
BASOPHILS # BLD AUTO: 0.04 K/UL (ref 0–0.2)
BASOPHILS NFR BLD: 0.4 % (ref 0–1.9)
BILIRUB SERPL-MCNC: 0.9 MG/DL (ref 0.1–1)
BUN SERPL-MCNC: 29 MG/DL (ref 8–23)
CALCIUM SERPL-MCNC: 9.3 MG/DL (ref 8.7–10.5)
CHLORIDE SERPL-SCNC: 109 MMOL/L (ref 95–110)
CO2 SERPL-SCNC: 20 MMOL/L (ref 23–29)
CREAT SERPL-MCNC: 0.8 MG/DL (ref 0.5–1.4)
DIFFERENTIAL METHOD: ABNORMAL
EOSINOPHIL # BLD AUTO: 0.2 K/UL (ref 0–0.5)
EOSINOPHIL NFR BLD: 2.4 % (ref 0–8)
ERYTHROCYTE [DISTWIDTH] IN BLOOD BY AUTOMATED COUNT: 13 % (ref 11.5–14.5)
EST. GFR  (AFRICAN AMERICAN): >60 ML/MIN/1.73 M^2
EST. GFR  (NON AFRICAN AMERICAN): >60 ML/MIN/1.73 M^2
ESTIMATED AVG GLUCOSE: 94 MG/DL (ref 68–131)
GLUCOSE SERPL-MCNC: 123 MG/DL (ref 70–110)
HBA1C MFR BLD: 4.9 % (ref 4–5.6)
HCT VFR BLD AUTO: 39.7 % (ref 37–48.5)
HGB BLD-MCNC: 12.6 G/DL (ref 12–16)
IMM GRANULOCYTES # BLD AUTO: 0.05 K/UL (ref 0–0.04)
IMM GRANULOCYTES NFR BLD AUTO: 0.5 % (ref 0–0.5)
LYMPHOCYTES # BLD AUTO: 1.3 K/UL (ref 1–4.8)
LYMPHOCYTES NFR BLD: 13.7 % (ref 18–48)
MCH RBC QN AUTO: 27.5 PG (ref 27–31)
MCHC RBC AUTO-ENTMCNC: 31.7 G/DL (ref 32–36)
MCV RBC AUTO: 87 FL (ref 82–98)
MONOCYTES # BLD AUTO: 1.3 K/UL (ref 0.3–1)
MONOCYTES NFR BLD: 13.5 % (ref 4–15)
NEUTROPHILS # BLD AUTO: 6.4 K/UL (ref 1.8–7.7)
NEUTROPHILS NFR BLD: 69.5 % (ref 38–73)
NRBC BLD-RTO: 0 /100 WBC
PLATELET # BLD AUTO: 191 K/UL (ref 150–450)
PMV BLD AUTO: 10.3 FL (ref 9.2–12.9)
POTASSIUM SERPL-SCNC: 3.6 MMOL/L (ref 3.5–5.1)
PROT SERPL-MCNC: 6.2 G/DL (ref 6–8.4)
RBC # BLD AUTO: 4.59 M/UL (ref 4–5.4)
SODIUM SERPL-SCNC: 140 MMOL/L (ref 136–145)
WBC # BLD AUTO: 9.23 K/UL (ref 3.9–12.7)

## 2021-09-25 PROCEDURE — G0378 HOSPITAL OBSERVATION PER HR: HCPCS

## 2021-09-25 PROCEDURE — 63600175 PHARM REV CODE 636 W HCPCS: Performed by: NURSE PRACTITIONER

## 2021-09-25 PROCEDURE — 83036 HEMOGLOBIN GLYCOSYLATED A1C: CPT | Performed by: NURSE PRACTITIONER

## 2021-09-25 PROCEDURE — 36415 COLL VENOUS BLD VENIPUNCTURE: CPT | Performed by: NURSE PRACTITIONER

## 2021-09-25 PROCEDURE — 85025 COMPLETE CBC W/AUTO DIFF WBC: CPT | Performed by: NURSE PRACTITIONER

## 2021-09-25 PROCEDURE — 96376 TX/PRO/DX INJ SAME DRUG ADON: CPT

## 2021-09-25 PROCEDURE — 25000003 PHARM REV CODE 250: Performed by: NURSE PRACTITIONER

## 2021-09-25 PROCEDURE — 96361 HYDRATE IV INFUSION ADD-ON: CPT

## 2021-09-25 PROCEDURE — 97163 PT EVAL HIGH COMPLEX 45 MIN: CPT

## 2021-09-25 PROCEDURE — 96375 TX/PRO/DX INJ NEW DRUG ADDON: CPT

## 2021-09-25 PROCEDURE — 80053 COMPREHEN METABOLIC PANEL: CPT | Performed by: NURSE PRACTITIONER

## 2021-09-25 PROCEDURE — 96372 THER/PROPH/DIAG INJ SC/IM: CPT | Mod: 59

## 2021-09-25 RX ORDER — KETOROLAC TROMETHAMINE 30 MG/ML
30 INJECTION, SOLUTION INTRAMUSCULAR; INTRAVENOUS ONCE
Status: COMPLETED | OUTPATIENT
Start: 2021-09-25 | End: 2021-09-25

## 2021-09-25 RX ORDER — TRAMADOL HYDROCHLORIDE 50 MG/1
50 TABLET ORAL EVERY 6 HOURS PRN
Status: DISCONTINUED | OUTPATIENT
Start: 2021-09-25 | End: 2021-09-28 | Stop reason: HOSPADM

## 2021-09-25 RX ORDER — KETOROLAC TROMETHAMINE 30 MG/ML
30 INJECTION, SOLUTION INTRAMUSCULAR; INTRAVENOUS ONCE
Status: DISCONTINUED | OUTPATIENT
Start: 2021-09-25 | End: 2021-09-25

## 2021-09-25 RX ADMIN — SODIUM CHLORIDE 75 ML/HR: 0.9 INJECTION, SOLUTION INTRAVENOUS at 03:09

## 2021-09-25 RX ADMIN — KETOROLAC TROMETHAMINE 30 MG: 30 INJECTION, SOLUTION INTRAMUSCULAR; INTRAVENOUS at 06:09

## 2021-09-25 RX ADMIN — CEFTRIAXONE 1 G: 1 INJECTION, POWDER, FOR SOLUTION INTRAMUSCULAR; INTRAVENOUS at 12:09

## 2021-09-25 RX ADMIN — ENOXAPARIN SODIUM 40 MG: 40 INJECTION SUBCUTANEOUS at 05:09

## 2021-09-25 RX ADMIN — ALPRAZOLAM 0.25 MG: 0.25 TABLET ORAL at 05:09

## 2021-09-26 LAB
ALBUMIN SERPL BCP-MCNC: 2.5 G/DL (ref 3.5–5.2)
ALP SERPL-CCNC: 104 U/L (ref 55–135)
ALT SERPL W/O P-5'-P-CCNC: 13 U/L (ref 10–44)
ANION GAP SERPL CALC-SCNC: 13 MMOL/L (ref 8–16)
AST SERPL-CCNC: 23 U/L (ref 10–40)
BACTERIA UR CULT: NO GROWTH
BASOPHILS # BLD AUTO: 0.06 K/UL (ref 0–0.2)
BASOPHILS NFR BLD: 0.8 % (ref 0–1.9)
BILIRUB SERPL-MCNC: 0.7 MG/DL (ref 0.1–1)
BUN SERPL-MCNC: 35 MG/DL (ref 8–23)
CALCIUM SERPL-MCNC: 9.6 MG/DL (ref 8.7–10.5)
CHLORIDE SERPL-SCNC: 111 MMOL/L (ref 95–110)
CO2 SERPL-SCNC: 19 MMOL/L (ref 23–29)
CREAT SERPL-MCNC: 0.8 MG/DL (ref 0.5–1.4)
DIFFERENTIAL METHOD: ABNORMAL
EOSINOPHIL # BLD AUTO: 0.4 K/UL (ref 0–0.5)
EOSINOPHIL NFR BLD: 5.1 % (ref 0–8)
ERYTHROCYTE [DISTWIDTH] IN BLOOD BY AUTOMATED COUNT: 12.9 % (ref 11.5–14.5)
EST. GFR  (AFRICAN AMERICAN): >60 ML/MIN/1.73 M^2
EST. GFR  (NON AFRICAN AMERICAN): >60 ML/MIN/1.73 M^2
GLUCOSE SERPL-MCNC: 101 MG/DL (ref 70–110)
HCT VFR BLD AUTO: 39.5 % (ref 37–48.5)
HGB BLD-MCNC: 12.6 G/DL (ref 12–16)
IMM GRANULOCYTES # BLD AUTO: 0.05 K/UL (ref 0–0.04)
IMM GRANULOCYTES NFR BLD AUTO: 0.7 % (ref 0–0.5)
LYMPHOCYTES # BLD AUTO: 1.4 K/UL (ref 1–4.8)
LYMPHOCYTES NFR BLD: 18.8 % (ref 18–48)
MCH RBC QN AUTO: 27.8 PG (ref 27–31)
MCHC RBC AUTO-ENTMCNC: 31.9 G/DL (ref 32–36)
MCV RBC AUTO: 87 FL (ref 82–98)
MONOCYTES # BLD AUTO: 0.8 K/UL (ref 0.3–1)
MONOCYTES NFR BLD: 10.9 % (ref 4–15)
NEUTROPHILS # BLD AUTO: 4.8 K/UL (ref 1.8–7.7)
NEUTROPHILS NFR BLD: 63.7 % (ref 38–73)
NRBC BLD-RTO: 0 /100 WBC
PLATELET # BLD AUTO: 215 K/UL (ref 150–450)
PMV BLD AUTO: 10.1 FL (ref 9.2–12.9)
POCT GLUCOSE: 101 MG/DL (ref 70–110)
POCT GLUCOSE: 90 MG/DL (ref 70–110)
POTASSIUM SERPL-SCNC: 3.3 MMOL/L (ref 3.5–5.1)
PROT SERPL-MCNC: 6.3 G/DL (ref 6–8.4)
RBC # BLD AUTO: 4.53 M/UL (ref 4–5.4)
SODIUM SERPL-SCNC: 143 MMOL/L (ref 136–145)
WBC # BLD AUTO: 7.59 K/UL (ref 3.9–12.7)

## 2021-09-26 PROCEDURE — 80053 COMPREHEN METABOLIC PANEL: CPT | Performed by: NURSE PRACTITIONER

## 2021-09-26 PROCEDURE — 85025 COMPLETE CBC W/AUTO DIFF WBC: CPT | Performed by: NURSE PRACTITIONER

## 2021-09-26 PROCEDURE — 96372 THER/PROPH/DIAG INJ SC/IM: CPT | Mod: 59

## 2021-09-26 PROCEDURE — 25000003 PHARM REV CODE 250: Performed by: NURSE PRACTITIONER

## 2021-09-26 PROCEDURE — 97167 OT EVAL HIGH COMPLEX 60 MIN: CPT

## 2021-09-26 PROCEDURE — G0378 HOSPITAL OBSERVATION PER HR: HCPCS

## 2021-09-26 PROCEDURE — 36415 COLL VENOUS BLD VENIPUNCTURE: CPT | Performed by: NURSE PRACTITIONER

## 2021-09-26 PROCEDURE — 96376 TX/PRO/DX INJ SAME DRUG ADON: CPT

## 2021-09-26 PROCEDURE — 97530 THERAPEUTIC ACTIVITIES: CPT

## 2021-09-26 PROCEDURE — 63600175 PHARM REV CODE 636 W HCPCS: Performed by: NURSE PRACTITIONER

## 2021-09-26 RX ORDER — DOCUSATE SODIUM 100 MG/1
100 CAPSULE, LIQUID FILLED ORAL 3 TIMES DAILY PRN
Status: DISCONTINUED | OUTPATIENT
Start: 2021-09-26 | End: 2021-09-28 | Stop reason: HOSPADM

## 2021-09-26 RX ORDER — ASPIRIN 81 MG/1
81 TABLET ORAL DAILY
Status: DISCONTINUED | OUTPATIENT
Start: 2021-09-27 | End: 2021-09-28 | Stop reason: HOSPADM

## 2021-09-26 RX ORDER — LOSARTAN POTASSIUM 50 MG/1
50 TABLET ORAL DAILY
Status: DISCONTINUED | OUTPATIENT
Start: 2021-09-27 | End: 2021-09-28 | Stop reason: HOSPADM

## 2021-09-26 RX ADMIN — ENOXAPARIN SODIUM 40 MG: 40 INJECTION SUBCUTANEOUS at 05:09

## 2021-09-26 RX ADMIN — ALPRAZOLAM 0.25 MG: 0.25 TABLET ORAL at 07:09

## 2021-09-26 RX ADMIN — CEFTRIAXONE 1 G: 1 INJECTION, POWDER, FOR SOLUTION INTRAMUSCULAR; INTRAVENOUS at 12:09

## 2021-09-26 RX ADMIN — ALPRAZOLAM 0.25 MG: 0.25 TABLET ORAL at 01:09

## 2021-09-27 LAB
ALBUMIN SERPL BCP-MCNC: 2.6 G/DL (ref 3.5–5.2)
ALP SERPL-CCNC: 101 U/L (ref 55–135)
ALT SERPL W/O P-5'-P-CCNC: 13 U/L (ref 10–44)
ANION GAP SERPL CALC-SCNC: 9 MMOL/L (ref 8–16)
AST SERPL-CCNC: 20 U/L (ref 10–40)
BILIRUB SERPL-MCNC: 0.6 MG/DL (ref 0.1–1)
BUN SERPL-MCNC: 26 MG/DL (ref 8–23)
CALCIUM SERPL-MCNC: 9.5 MG/DL (ref 8.7–10.5)
CHLORIDE SERPL-SCNC: 113 MMOL/L (ref 95–110)
CO2 SERPL-SCNC: 22 MMOL/L (ref 23–29)
CREAT SERPL-MCNC: 0.7 MG/DL (ref 0.5–1.4)
EST. GFR  (AFRICAN AMERICAN): >60 ML/MIN/1.73 M^2
EST. GFR  (NON AFRICAN AMERICAN): >60 ML/MIN/1.73 M^2
GLUCOSE SERPL-MCNC: 104 MG/DL (ref 70–110)
POCT GLUCOSE: 121 MG/DL (ref 70–110)
POTASSIUM SERPL-SCNC: 3.4 MMOL/L (ref 3.5–5.1)
PROT SERPL-MCNC: 6.2 G/DL (ref 6–8.4)
SODIUM SERPL-SCNC: 144 MMOL/L (ref 136–145)

## 2021-09-27 PROCEDURE — 97530 THERAPEUTIC ACTIVITIES: CPT | Mod: CQ

## 2021-09-27 PROCEDURE — 80053 COMPREHEN METABOLIC PANEL: CPT | Performed by: NURSE PRACTITIONER

## 2021-09-27 PROCEDURE — G0378 HOSPITAL OBSERVATION PER HR: HCPCS

## 2021-09-27 PROCEDURE — 96376 TX/PRO/DX INJ SAME DRUG ADON: CPT

## 2021-09-27 PROCEDURE — 36415 COLL VENOUS BLD VENIPUNCTURE: CPT | Performed by: NURSE PRACTITIONER

## 2021-09-27 PROCEDURE — 25000003 PHARM REV CODE 250: Performed by: NURSE PRACTITIONER

## 2021-09-27 PROCEDURE — 97110 THERAPEUTIC EXERCISES: CPT | Mod: CQ

## 2021-09-27 PROCEDURE — 63600175 PHARM REV CODE 636 W HCPCS: Performed by: NURSE PRACTITIONER

## 2021-09-27 PROCEDURE — 96372 THER/PROPH/DIAG INJ SC/IM: CPT | Mod: 59

## 2021-09-27 RX ADMIN — ALPRAZOLAM 0.25 MG: 0.25 TABLET ORAL at 12:09

## 2021-09-27 RX ADMIN — ENOXAPARIN SODIUM 40 MG: 40 INJECTION SUBCUTANEOUS at 05:09

## 2021-09-27 RX ADMIN — LOSARTAN POTASSIUM 50 MG: 50 TABLET, FILM COATED ORAL at 09:09

## 2021-09-27 RX ADMIN — ALPRAZOLAM 0.25 MG: 0.25 TABLET ORAL at 07:09

## 2021-09-27 RX ADMIN — CEFTRIAXONE 1 G: 1 INJECTION, POWDER, FOR SOLUTION INTRAMUSCULAR; INTRAVENOUS at 12:09

## 2021-09-27 RX ADMIN — ASPIRIN 81 MG: 81 TABLET ORAL at 09:09

## 2021-09-27 RX ADMIN — MULTIPLE VITAMINS W/ MINERALS TAB 1 TABLET: TAB at 09:09

## 2021-09-28 VITALS
WEIGHT: 168.19 LBS | RESPIRATION RATE: 18 BRPM | HEART RATE: 71 BPM | BODY MASS INDEX: 27.99 KG/M2 | OXYGEN SATURATION: 93 % | TEMPERATURE: 98 F | DIASTOLIC BLOOD PRESSURE: 70 MMHG | SYSTOLIC BLOOD PRESSURE: 145 MMHG

## 2021-09-28 PROBLEM — R73.9 HYPERGLYCEMIA: Status: RESOLVED | Noted: 2021-09-24 | Resolved: 2021-09-28

## 2021-09-28 PROBLEM — N30.01 ACUTE CYSTITIS WITH HEMATURIA: Status: RESOLVED | Noted: 2021-09-24 | Resolved: 2021-09-28

## 2021-09-28 PROCEDURE — 25000003 PHARM REV CODE 250: Performed by: NURSE PRACTITIONER

## 2021-09-28 PROCEDURE — G0378 HOSPITAL OBSERVATION PER HR: HCPCS

## 2021-09-28 PROCEDURE — 63600175 PHARM REV CODE 636 W HCPCS: Performed by: NURSE PRACTITIONER

## 2021-09-28 PROCEDURE — 96376 TX/PRO/DX INJ SAME DRUG ADON: CPT

## 2021-09-28 RX ORDER — ACETAMINOPHEN 500 MG/1
500 CAPSULE, LIQUID FILLED ORAL EVERY 6 HOURS PRN
Refills: 0
Start: 2021-09-28

## 2021-09-28 RX ORDER — ASPIRIN 81 MG/1
81 TABLET ORAL DAILY
Refills: 0
Start: 2021-09-28

## 2021-09-28 RX ORDER — CALCIUM CARBONATE 600 MG
600 TABLET ORAL DAILY
Refills: 0
Start: 2021-09-28

## 2021-09-28 RX ORDER — MULTIVITAMIN
1 TABLET ORAL DAILY
Start: 2021-09-28

## 2021-09-28 RX ORDER — PRAVASTATIN SODIUM 40 MG/1
40 TABLET ORAL DAILY
Start: 2021-09-28

## 2021-09-28 RX ORDER — HYDROCODONE BITARTRATE AND ACETAMINOPHEN 5; 325 MG/1; MG/1
1 TABLET ORAL EVERY 8 HOURS PRN
Qty: 8 TABLET | Refills: 0 | Status: ON HOLD | OUTPATIENT
Start: 2021-09-28 | End: 2022-03-20 | Stop reason: HOSPADM

## 2021-09-28 RX ORDER — ALPRAZOLAM 0.25 MG/1
0.25 TABLET ORAL 3 TIMES DAILY PRN
Qty: 30 TABLET | Refills: 0 | Status: SHIPPED | OUTPATIENT
Start: 2021-09-28

## 2021-09-28 RX ADMIN — ASPIRIN 81 MG: 81 TABLET ORAL at 10:09

## 2021-09-28 RX ADMIN — CEFTRIAXONE 1 G: 1 INJECTION, POWDER, FOR SOLUTION INTRAMUSCULAR; INTRAVENOUS at 12:09

## 2021-09-28 RX ADMIN — LOSARTAN POTASSIUM 50 MG: 50 TABLET, FILM COATED ORAL at 10:09

## 2021-09-28 RX ADMIN — MULTIPLE VITAMINS W/ MINERALS TAB 1 TABLET: TAB at 10:09

## 2021-09-28 RX ADMIN — ALPRAZOLAM 0.25 MG: 0.25 TABLET ORAL at 12:09

## 2022-03-15 ENCOUNTER — HOSPITAL ENCOUNTER (INPATIENT)
Facility: HOSPITAL | Age: 72
LOS: 2 days | Discharge: HOME | DRG: 853 | End: 2022-03-20
Attending: EMERGENCY MEDICINE | Admitting: FAMILY MEDICINE
Payer: MEDICARE

## 2022-03-15 DIAGNOSIS — R41.82 ALTERED MENTAL STATUS: ICD-10-CM

## 2022-03-15 DIAGNOSIS — L08.9 WOUND INFECTION: ICD-10-CM

## 2022-03-15 DIAGNOSIS — L89.159 PRESSURE INJURY OF SKIN OF SACRAL REGION, UNSPECIFIED INJURY STAGE: ICD-10-CM

## 2022-03-15 DIAGNOSIS — A41.9 SEVERE SEPSIS: Primary | ICD-10-CM

## 2022-03-15 DIAGNOSIS — T14.8XXA WOUND INFECTION: ICD-10-CM

## 2022-03-15 DIAGNOSIS — R65.20 SEVERE SEPSIS: Primary | ICD-10-CM

## 2022-03-15 PROBLEM — G93.41 ACUTE METABOLIC ENCEPHALOPATHY: Status: ACTIVE | Noted: 2022-03-15

## 2022-03-15 LAB
ALBUMIN SERPL BCP-MCNC: 2.5 G/DL (ref 3.5–5.2)
ALP SERPL-CCNC: 106 U/L (ref 55–135)
ALT SERPL W/O P-5'-P-CCNC: 19 U/L (ref 10–44)
ANION GAP SERPL CALC-SCNC: 9 MMOL/L (ref 8–16)
AST SERPL-CCNC: 29 U/L (ref 10–40)
BACTERIA #/AREA URNS HPF: ABNORMAL /HPF
BASOPHILS # BLD AUTO: 0.04 K/UL (ref 0–0.2)
BASOPHILS NFR BLD: 0.3 % (ref 0–1.9)
BILIRUB SERPL-MCNC: 0.3 MG/DL (ref 0.1–1)
BILIRUB UR QL STRIP: NEGATIVE
BUN SERPL-MCNC: 22 MG/DL (ref 8–23)
CALCIUM SERPL-MCNC: 8.9 MG/DL (ref 8.7–10.5)
CHLORIDE SERPL-SCNC: 107 MMOL/L (ref 95–110)
CLARITY UR: CLEAR
CO2 SERPL-SCNC: 27 MMOL/L (ref 23–29)
COLOR UR: YELLOW
CREAT SERPL-MCNC: 0.8 MG/DL (ref 0.5–1.4)
CTP QC/QA: YES
DIFFERENTIAL METHOD: ABNORMAL
EOSINOPHIL # BLD AUTO: 0.7 K/UL (ref 0–0.5)
EOSINOPHIL NFR BLD: 6.2 % (ref 0–8)
ERYTHROCYTE [DISTWIDTH] IN BLOOD BY AUTOMATED COUNT: 13.9 % (ref 11.5–14.5)
EST. GFR  (AFRICAN AMERICAN): >60 ML/MIN/1.73 M^2
EST. GFR  (NON AFRICAN AMERICAN): >60 ML/MIN/1.73 M^2
GLUCOSE SERPL-MCNC: 166 MG/DL (ref 70–110)
GLUCOSE UR QL STRIP: NEGATIVE
HCT VFR BLD AUTO: 36.3 % (ref 37–48.5)
HGB BLD-MCNC: 11.3 G/DL (ref 12–16)
HGB UR QL STRIP: ABNORMAL
HYALINE CASTS #/AREA URNS LPF: 0 /LPF
IMM GRANULOCYTES # BLD AUTO: 0.08 K/UL (ref 0–0.04)
IMM GRANULOCYTES NFR BLD AUTO: 0.7 % (ref 0–0.5)
KETONES UR QL STRIP: NEGATIVE
LACTATE SERPL-SCNC: 2.3 MMOL/L (ref 0.5–2.2)
LACTATE SERPL-SCNC: 2.5 MMOL/L (ref 0.5–2.2)
LEUKOCYTE ESTERASE UR QL STRIP: NEGATIVE
LYMPHOCYTES # BLD AUTO: 1.3 K/UL (ref 1–4.8)
LYMPHOCYTES NFR BLD: 10.7 % (ref 18–48)
MCH RBC QN AUTO: 27.3 PG (ref 27–31)
MCHC RBC AUTO-ENTMCNC: 31.1 G/DL (ref 32–36)
MCV RBC AUTO: 88 FL (ref 82–98)
MICROSCOPIC COMMENT: ABNORMAL
MONOCYTES # BLD AUTO: 1 K/UL (ref 0.3–1)
MONOCYTES NFR BLD: 8.1 % (ref 4–15)
NEUTROPHILS # BLD AUTO: 8.8 K/UL (ref 1.8–7.7)
NEUTROPHILS NFR BLD: 74 % (ref 38–73)
NITRITE UR QL STRIP: NEGATIVE
NRBC BLD-RTO: 0 /100 WBC
PH UR STRIP: 5 [PH] (ref 5–8)
PLATELET # BLD AUTO: 302 K/UL (ref 150–450)
PMV BLD AUTO: 10.3 FL (ref 9.2–12.9)
POTASSIUM SERPL-SCNC: 4 MMOL/L (ref 3.5–5.1)
PROCALCITONIN SERPL IA-MCNC: 0.39 NG/ML
PROT SERPL-MCNC: 6.7 G/DL (ref 6–8.4)
PROT UR QL STRIP: ABNORMAL
RBC # BLD AUTO: 4.14 M/UL (ref 4–5.4)
RBC #/AREA URNS HPF: 6 /HPF (ref 0–4)
SARS-COV-2 RDRP RESP QL NAA+PROBE: NEGATIVE
SODIUM SERPL-SCNC: 143 MMOL/L (ref 136–145)
SP GR UR STRIP: >=1.03 (ref 1–1.03)
SQUAMOUS #/AREA URNS HPF: 3 /HPF
TROPONIN I SERPL DL<=0.01 NG/ML-MCNC: 0.01 NG/ML (ref 0–0.03)
URN SPEC COLLECT METH UR: ABNORMAL
UROBILINOGEN UR STRIP-ACNC: NEGATIVE EU/DL
WBC # BLD AUTO: 11.91 K/UL (ref 3.9–12.7)
WBC #/AREA URNS HPF: 1 /HPF (ref 0–5)

## 2022-03-15 PROCEDURE — 25000003 PHARM REV CODE 250: Performed by: EMERGENCY MEDICINE

## 2022-03-15 PROCEDURE — 63600175 PHARM REV CODE 636 W HCPCS: Performed by: EMERGENCY MEDICINE

## 2022-03-15 PROCEDURE — U0002 COVID-19 LAB TEST NON-CDC: HCPCS | Performed by: EMERGENCY MEDICINE

## 2022-03-15 PROCEDURE — 99285 EMERGENCY DEPT VISIT HI MDM: CPT | Mod: 25

## 2022-03-15 PROCEDURE — 93005 ELECTROCARDIOGRAM TRACING: CPT

## 2022-03-15 PROCEDURE — 96367 TX/PROPH/DG ADDL SEQ IV INF: CPT

## 2022-03-15 PROCEDURE — 96365 THER/PROPH/DIAG IV INF INIT: CPT

## 2022-03-15 PROCEDURE — 96366 THER/PROPH/DIAG IV INF ADDON: CPT

## 2022-03-15 PROCEDURE — G0378 HOSPITAL OBSERVATION PER HR: HCPCS

## 2022-03-15 PROCEDURE — 25000003 PHARM REV CODE 250: Performed by: INTERNAL MEDICINE

## 2022-03-15 PROCEDURE — 85025 COMPLETE CBC W/AUTO DIFF WBC: CPT | Performed by: EMERGENCY MEDICINE

## 2022-03-15 PROCEDURE — 87040 BLOOD CULTURE FOR BACTERIA: CPT | Mod: 59 | Performed by: EMERGENCY MEDICINE

## 2022-03-15 PROCEDURE — 84484 ASSAY OF TROPONIN QUANT: CPT | Performed by: EMERGENCY MEDICINE

## 2022-03-15 PROCEDURE — 93010 EKG 12-LEAD: ICD-10-PCS | Mod: ,,, | Performed by: INTERNAL MEDICINE

## 2022-03-15 PROCEDURE — 80053 COMPREHEN METABOLIC PANEL: CPT | Performed by: EMERGENCY MEDICINE

## 2022-03-15 PROCEDURE — 84145 PROCALCITONIN (PCT): CPT | Performed by: EMERGENCY MEDICINE

## 2022-03-15 PROCEDURE — 96361 HYDRATE IV INFUSION ADD-ON: CPT

## 2022-03-15 PROCEDURE — 83605 ASSAY OF LACTIC ACID: CPT | Mod: 91 | Performed by: EMERGENCY MEDICINE

## 2022-03-15 PROCEDURE — 36415 COLL VENOUS BLD VENIPUNCTURE: CPT | Performed by: EMERGENCY MEDICINE

## 2022-03-15 PROCEDURE — 81000 URINALYSIS NONAUTO W/SCOPE: CPT | Performed by: EMERGENCY MEDICINE

## 2022-03-15 PROCEDURE — 93010 ELECTROCARDIOGRAM REPORT: CPT | Mod: ,,, | Performed by: INTERNAL MEDICINE

## 2022-03-15 RX ORDER — SODIUM CHLORIDE 9 MG/ML
INJECTION, SOLUTION INTRAVENOUS CONTINUOUS
Status: DISCONTINUED | OUTPATIENT
Start: 2022-03-15 | End: 2022-03-16

## 2022-03-15 RX ORDER — ACETAMINOPHEN 325 MG/1
650 TABLET ORAL EVERY 6 HOURS PRN
Status: DISCONTINUED | OUTPATIENT
Start: 2022-03-15 | End: 2022-03-20 | Stop reason: HOSPADM

## 2022-03-15 RX ORDER — CEFEPIME HYDROCHLORIDE 1 G/50ML
2 INJECTION, SOLUTION INTRAVENOUS
Status: COMPLETED | OUTPATIENT
Start: 2022-03-15 | End: 2022-03-15

## 2022-03-15 RX ORDER — ONDANSETRON 2 MG/ML
4 INJECTION INTRAMUSCULAR; INTRAVENOUS EVERY 8 HOURS PRN
Status: DISCONTINUED | OUTPATIENT
Start: 2022-03-15 | End: 2022-03-20 | Stop reason: HOSPADM

## 2022-03-15 RX ORDER — GUAIFENESIN 100 MG/5ML
200 SOLUTION ORAL EVERY 4 HOURS PRN
Status: DISCONTINUED | OUTPATIENT
Start: 2022-03-15 | End: 2022-03-20 | Stop reason: HOSPADM

## 2022-03-15 RX ORDER — CEFEPIME HYDROCHLORIDE 1 G/50ML
2 INJECTION, SOLUTION INTRAVENOUS
Status: DISCONTINUED | OUTPATIENT
Start: 2022-03-16 | End: 2022-03-20 | Stop reason: HOSPADM

## 2022-03-15 RX ORDER — CEFEPIME HYDROCHLORIDE 1 G/50ML
1 INJECTION, SOLUTION INTRAVENOUS
Status: DISCONTINUED | OUTPATIENT
Start: 2022-03-16 | End: 2022-03-15

## 2022-03-15 RX ORDER — MAG HYDROX/ALUMINUM HYD/SIMETH 200-200-20
30 SUSPENSION, ORAL (FINAL DOSE FORM) ORAL EVERY 6 HOURS PRN
Status: DISCONTINUED | OUTPATIENT
Start: 2022-03-15 | End: 2022-03-20 | Stop reason: HOSPADM

## 2022-03-15 RX ORDER — IPRATROPIUM BROMIDE AND ALBUTEROL SULFATE 2.5; .5 MG/3ML; MG/3ML
3 SOLUTION RESPIRATORY (INHALATION) EVERY 4 HOURS PRN
Status: DISCONTINUED | OUTPATIENT
Start: 2022-03-15 | End: 2022-03-20 | Stop reason: HOSPADM

## 2022-03-15 RX ADMIN — VANCOMYCIN HYDROCHLORIDE 1750 MG: 500 INJECTION, POWDER, LYOPHILIZED, FOR SOLUTION INTRAVENOUS at 10:03

## 2022-03-15 RX ADMIN — CEFEPIME HYDROCHLORIDE 2 G: 2 INJECTION, SOLUTION INTRAVENOUS at 07:03

## 2022-03-15 RX ADMIN — SODIUM CHLORIDE: 0.9 INJECTION, SOLUTION INTRAVENOUS at 10:03

## 2022-03-15 RX ADMIN — SODIUM CHLORIDE 1000 ML: 0.9 INJECTION, SOLUTION INTRAVENOUS at 07:03

## 2022-03-15 NOTE — ED PROVIDER NOTES
SCRIBE #1 NOTE: I, Shiela Hayes, am scribing for, and in the presence of, Moses Vigil Jr., MD. I have scribed the entire note.       History     Chief Complaint   Patient presents with    Wound Infection     Sent from [facility] for sacral wound eval     Review of patient's allergies indicates:   Allergen Reactions    Iodine and iodide containing products     Levofloxacin Other (See Comments)         History of Present Illness     HPI    3/15/2022, 6:43 PM  History obtained from the patient's son        History of Present Illness: Michela Arriaza is a 72 y.o. female patient with a PMHx of hyperlipidemia and HTN who presents to the Emergency Department for evaluation of wound infection which onset several hours pta. Pt had altered mental status at bedside. Pt's son reports that pt was sent from [facility] for sacral wound evaluation. Pt's son also reports that pt recently was diagnosed with a UTI and was taking abx to treat it.  Symptoms are constant and moderate in severity. No mitigating or exacerbating factors reported. No associated sxs reported. No further complaints or concerns at this time.       Arrival mode: AASI    PCP: Anatoliy Mayberry MD        Past Medical History:  Past Medical History:   Diagnosis Date    Hyperlipidemia     Hypertension        Past Surgical History:  History reviewed. No pertinent surgical history.      Family History:  History reviewed. No pertinent family history.    Social History:  Social History     Tobacco Use    Smoking status: Former Smoker    Smokeless tobacco: Never Used   Substance and Sexual Activity    Alcohol use: No     Alcohol/week: 0.0 standard drinks    Drug use: No    Sexual activity: Yes     Partners: Male        Review of Systems     Review of Systems   Unable to perform ROS: Mental status change        Physical Exam     Initial Vitals [03/15/22 1820]   BP Pulse Resp Temp SpO2   (!) 132/94 88 18 99.5 °F (37.5 °C) 98 %      MAP       --    "       Physical Exam  Nursing Notes and Vital Signs Reviewed.  Constitutional: Patient is in no acute distress. Well-developed and well-nourished.  Patient is confused from baseline.  She appears to be weak and slow to respond.  Head: Atraumatic. Normocephalic.  Eyes:  EOM intact.  No scleral icterus.  ENT: Mucous membranes are moist.  Nares clear   Neck:  Full ROM. No JVD.  Cardiovascular: Regular rate. Regular rhythm No murmurs, rubs, or gallops. Distal pulses are 2+ and symmetric  Pulmonary/Chest: No respiratory distress. Clear to auscultation bilaterally. No wheezing or rales.  Equal chest wall rise bilaterally  Abdominal: Soft and non-distended.  There is no tenderness.  No rebound, guarding, or rigidity. Good bowel sounds.  Genitourinary: No CVA tenderness.  No suprapubic tenderness  Musculoskeletal: Moves all extremities. No obvious deformities.  5 x 5 strength in all extremities   Skin: Warm and dry.  Open sacral decubitus approximately 3 x 4 cm.  This wound was packed.  No pus noted.  Exudate the wound base.  Neurological:  Alert, awake, and appropriate.  Normal speech.  No acute focal neurological deficits are appreciated.  Two through 12 intact bilaterally.  Psychiatric: Normal affect. Good eye contact. Appropriate in content.     ED Course   Procedures  ED Vital Signs:  Vitals:    03/15/22 1820 03/15/22 1957   BP: (!) 132/94 134/68   Pulse: 88 84   Resp: 18 20   Temp: 99.5 °F (37.5 °C)    SpO2: 98% 97%   Weight: 70.3 kg (154 lb 15.7 oz)    Height: 5' 5" (1.651 m)        Abnormal Lab Results:  Labs Reviewed   CBC W/ AUTO DIFFERENTIAL - Abnormal; Notable for the following components:       Result Value    Hemoglobin 11.3 (*)     Hematocrit 36.3 (*)     MCHC 31.1 (*)     Immature Granulocytes 0.7 (*)     Gran # (ANC) 8.8 (*)     Immature Grans (Abs) 0.08 (*)     Eos # 0.7 (*)     Gran % 74.0 (*)     Lymph % 10.7 (*)     All other components within normal limits   COMPREHENSIVE METABOLIC PANEL - Abnormal; " Notable for the following components:    Glucose 166 (*)     Albumin 2.5 (*)     All other components within normal limits   LACTIC ACID, PLASMA - Abnormal; Notable for the following components:    Lactate (Lactic Acid) 2.5 (*)     All other components within normal limits   URINALYSIS, REFLEX TO URINE CULTURE - Abnormal; Notable for the following components:    Specific Gravity, UA >=1.030 (*)     Protein, UA 1+ (*)     Occult Blood UA 2+ (*)     All other components within normal limits    Narrative:     Specimen Source->Urine   PROCALCITONIN - Abnormal; Notable for the following components:    Procalcitonin 0.39 (*)     All other components within normal limits   URINALYSIS MICROSCOPIC - Abnormal; Notable for the following components:    RBC, UA 6 (*)     All other components within normal limits    Narrative:     Specimen Source->Urine   CULTURE, BLOOD   CULTURE, BLOOD   TROPONIN I   SARS-COV-2 RDRP GENE    Narrative:     This test utilizes isothermal nucleic acid amplification   technology to detect the SARS-CoV-2 RdRp nucleic acid segment.   The analytical sensitivity (limit of detection) is 125 genome   equivalents/mL.   A POSITIVE result implies infection with the SARS-CoV-2 virus;   the patient is presumed to be contagious.     A NEGATIVE result means that SARS-CoV-2 nucleic acids are not   present above the limit of detection. A NEGATIVE result should be   treated as presumptive. It does not rule out the possibility of   COVID-19 and should not be the sole basis for treatment decisions.   If COVID-19 is strongly suspected based on clinical and exposure   history, re-testing using an alternate molecular assay should be   considered.   This test is only for use under the Food and Drug   Administration s Emergency Use Authorization (EUA).   Commercial kits are provided by [employer].   Performance characteristics of the EUA have been independently   verified by [facility] Department of    Pathology and Laboratory Medicine.   _________________________________________________________________   The authorized Fact Sheet for Healthcare Providers and the authorized Fact   Sheet for Patients of the ID NOW COVID-19 are available on the FDA   website:     https://www.fda.gov/media/824591/download  https://www.fda.gov/media/871577/download                All Lab Results:  Results for orders placed or performed during the hospital encounter of 03/15/22   CBC auto differential   Result Value Ref Range    WBC 11.91 3.90 - 12.70 K/uL    RBC 4.14 4.00 - 5.40 M/uL    Hemoglobin 11.3 (L) 12.0 - 16.0 g/dL    Hematocrit 36.3 (L) 37.0 - 48.5 %    MCV 88 82 - 98 fL    MCH 27.3 27.0 - 31.0 pg    MCHC 31.1 (L) 32.0 - 36.0 g/dL    RDW 13.9 11.5 - 14.5 %    Platelets 302 150 - 450 K/uL    MPV 10.3 9.2 - 12.9 fL    Immature Granulocytes 0.7 (H) 0.0 - 0.5 %    Gran # (ANC) 8.8 (H) 1.8 - 7.7 K/uL    Immature Grans (Abs) 0.08 (H) 0.00 - 0.04 K/uL    Lymph # 1.3 1.0 - 4.8 K/uL    Mono # 1.0 0.3 - 1.0 K/uL    Eos # 0.7 (H) 0.0 - 0.5 K/uL    Baso # 0.04 0.00 - 0.20 K/uL    nRBC 0 0 /100 WBC    Gran % 74.0 (H) 38.0 - 73.0 %    Lymph % 10.7 (L) 18.0 - 48.0 %    Mono % 8.1 4.0 - 15.0 %    Eosinophil % 6.2 0.0 - 8.0 %    Basophil % 0.3 0.0 - 1.9 %    Differential Method Automated    Comprehensive metabolic panel   Result Value Ref Range    Sodium 143 136 - 145 mmol/L    Potassium 4.0 3.5 - 5.1 mmol/L    Chloride 107 95 - 110 mmol/L    CO2 27 23 - 29 mmol/L    Glucose 166 (H) 70 - 110 mg/dL    BUN 22 8 - 23 mg/dL    Creatinine 0.8 0.5 - 1.4 mg/dL    Calcium 8.9 8.7 - 10.5 mg/dL    Total Protein 6.7 6.0 - 8.4 g/dL    Albumin 2.5 (L) 3.5 - 5.2 g/dL    Total Bilirubin 0.3 0.1 - 1.0 mg/dL    Alkaline Phosphatase 106 55 - 135 U/L    AST 29 10 - 40 U/L    ALT 19 10 - 44 U/L    Anion Gap 9 8 - 16 mmol/L    eGFR if African American >60 >60 mL/min/1.73 m^2    eGFR if non African American >60 >60 mL/min/1.73 m^2   Lactic acid, plasma #1    Result Value Ref Range    Lactate (Lactic Acid) 2.5 (H) 0.5 - 2.2 mmol/L   Urinalysis, Reflex to Urine Culture Urine, Clean Catch    Specimen: Urine   Result Value Ref Range    Specimen UA Urine, Catheterized     Color, UA Yellow Yellow, Straw, Divina    Appearance, UA Clear Clear    pH, UA 5.0 5.0 - 8.0    Specific Gravity, UA >=1.030 (A) 1.005 - 1.030    Protein, UA 1+ (A) Negative    Glucose, UA Negative Negative    Ketones, UA Negative Negative    Bilirubin (UA) Negative Negative    Occult Blood UA 2+ (A) Negative    Nitrite, UA Negative Negative    Urobilinogen, UA Negative <2.0 EU/dL    Leukocytes, UA Negative Negative   Procalcitonin   Result Value Ref Range    Procalcitonin 0.39 (H) <0.25 ng/mL   Troponin I   Result Value Ref Range    Troponin I 0.007 0.000 - 0.026 ng/mL   Urinalysis Microscopic   Result Value Ref Range    RBC, UA 6 (H) 0 - 4 /hpf    WBC, UA 1 0 - 5 /hpf    Bacteria Occasional None-Occ /hpf    Squam Epithel, UA 3 /hpf    Hyaline Casts, UA 0 0-1/lpf /lpf    Microscopic Comment SEE COMMENT    POCT COVID-19 Rapid Screening   Result Value Ref Range    POC Rapid COVID Negative Negative     Acceptable Yes        Imaging Results:  Imaging Results          CT Head Without Contrast (Final result)  Result time 03/15/22 19:12:06    Final result by Albert Fulton MD (03/15/22 19:12:06)                 Impression:      No acute abnormality.    Atrophy and chronic white matter changes    Mild motion artifacts.    All CT scans   are performed using dose optimization techniques including the following: automated exposure control; adjustment of the mA and/or kV; use of iterative reconstruction technique.  Dose modulation was employed for ALARA by means of: Automated exposure control; adjustment of the mA and/or kV according to patient size (this includes techniques or standardized protocols for targeted exams where dose is matched to indication/reason for exam; i.e. extremities or head);  and/or use of iterative reconstructive technique.      Electronically signed by: Donaldo Price  Date:    03/15/2022  Time:    19:12             Narrative:    EXAMINATION:  CT HEAD WITHOUT CONTRAST    CLINICAL HISTORY:  Mental status change, unknown cause;    TECHNIQUE:  Low dose axial CT images obtained throughout the head without intravenous contrast. Sagittal and coronal reconstructions were performed.    COMPARISON:  None.    FINDINGS:  Mild motion artifacts    No parenchymal mass, hemorrhage, edema or major vascular distribution infarct.    Atrophy and chronic white matter changes                               X-Ray Chest AP Portable (Final result)  Result time 03/15/22 18:55:20    Final result by Albert Fulton MD (03/15/22 18:55:20)                 Impression:      No acute abnormality.  Cardiomegaly.  Mild subsegmental atelectasis.      Electronically signed by: Donaldo Price  Date:    03/15/2022  Time:    18:55             Narrative:    EXAMINATION:  XR CHEST AP PORTABLE    CLINICAL HISTORY:  Sepsis;    TECHNIQUE:  Single frontal view of the chest was performed.    COMPARISON:  None    FINDINGS:  Mild subsegmental atelectasis.The lungs are clear, with normal appearance of pulmonary vasculature and no pleural effusion or pneumothorax.    The cardiac silhouette is mildly enlarged.  The hilar and mediastinal contours are unremarkable.    Bones are intact.                                 The EKG was ordered, reviewed, and independently interpreted by the ED provider.  Interpretation time: 1907  Rate: 103 BPM  Rhythm: sinus tachycardia  Interpretation: Nonspecific ST abnormality. No STEMI.             The Emergency Provider reviewed the vital signs and test results, which are outlined above.     ED Discussion     8:55 PM: Discussed case with Dr. Thuan Pena MD ([facility]). Dr. Soriano agrees with current care and management of pt and accepts admission.   Admitting Service: [facility]  Admitting  Physician: Dr. Soriano  Admit to: obs [facility]    8:55 PM: Re-evaluated pt. I have discussed test results, shared treatment plan, and the need for admission with patient and family at bedside. Pt and family express understanding at this time and agree with all information. All questions answered. Pt and family have no further questions or concerns at this time. Pt is ready for admit.         Medical Decision Making:   Clinical Tests:   Lab Tests: Ordered and Reviewed  Radiological Study: Ordered and Reviewed  Medical Tests: Ordered and Reviewed           ED Medication(s):  Medications   sodium chloride 0.9% bolus 1,000 mL (1,000 mLs Intravenous New Bag 3/15/22 1946)   cefepime in dextrose 5 % IVPB 2 g (0 g Intravenous Stopped 3/15/22 2015)       New Prescriptions    No medications on file               Scribe Attestation:   Scribe #1: I performed the above scribed service and the documentation accurately describes the services I performed. I attest to the accuracy of the note.     Attending:   Physician Attestation Statement for Scribe #1: I, Moses Vigil Jr., MD, personally performed the services described in this documentation, as scribed by Shiela Hayes, in my presence, and it is both accurate and complete.           Clinical Impression       ICD-10-CM ICD-9-CM   1. Wound infection  T14.8XXA 958.3    L08.9    2. Altered mental status  R41.82 780.97   3. Pressure injury of skin of sacral region, unspecified injury stage  L89.159 707.03     707.20       Disposition:   Disposition: Placed in Observation  Condition: Fair       Moses Vigil Jr., MD  03/15/22 2057

## 2022-03-16 PROBLEM — E88.09 HYPOALBUMINEMIA: Status: ACTIVE | Noted: 2022-03-16

## 2022-03-16 PROBLEM — D64.9 NORMOCYTIC ANEMIA: Status: ACTIVE | Noted: 2022-03-16

## 2022-03-16 PROBLEM — N39.0 UTI (URINARY TRACT INFECTION): Status: ACTIVE | Noted: 2022-03-16

## 2022-03-16 PROBLEM — R65.20 SEVERE SEPSIS: Status: ACTIVE | Noted: 2022-03-15

## 2022-03-16 LAB
ALBUMIN SERPL BCP-MCNC: 2 G/DL (ref 3.5–5.2)
ALP SERPL-CCNC: 96 U/L (ref 55–135)
ALT SERPL W/O P-5'-P-CCNC: 14 U/L (ref 10–44)
AMMONIA PLAS-SCNC: 18 UMOL/L (ref 10–50)
ANION GAP SERPL CALC-SCNC: 8 MMOL/L (ref 8–16)
AST SERPL-CCNC: 24 U/L (ref 10–40)
BASOPHILS # BLD AUTO: 0.02 K/UL (ref 0–0.2)
BASOPHILS NFR BLD: 0.2 % (ref 0–1.9)
BILIRUB SERPL-MCNC: 0.5 MG/DL (ref 0.1–1)
BUN SERPL-MCNC: 19 MG/DL (ref 8–23)
CALCIUM SERPL-MCNC: 8.5 MG/DL (ref 8.7–10.5)
CHLORIDE SERPL-SCNC: 111 MMOL/L (ref 95–110)
CO2 SERPL-SCNC: 22 MMOL/L (ref 23–29)
CREAT SERPL-MCNC: 0.6 MG/DL (ref 0.5–1.4)
DIFFERENTIAL METHOD: ABNORMAL
EOSINOPHIL # BLD AUTO: 1 K/UL (ref 0–0.5)
EOSINOPHIL NFR BLD: 11.1 % (ref 0–8)
ERYTHROCYTE [DISTWIDTH] IN BLOOD BY AUTOMATED COUNT: 13.9 % (ref 11.5–14.5)
EST. GFR  (AFRICAN AMERICAN): >60 ML/MIN/1.73 M^2
EST. GFR  (NON AFRICAN AMERICAN): >60 ML/MIN/1.73 M^2
GLUCOSE SERPL-MCNC: 108 MG/DL (ref 70–110)
HCT VFR BLD AUTO: 34.1 % (ref 37–48.5)
HGB BLD-MCNC: 10.7 G/DL (ref 12–16)
IMM GRANULOCYTES # BLD AUTO: 0.05 K/UL (ref 0–0.04)
IMM GRANULOCYTES NFR BLD AUTO: 0.5 % (ref 0–0.5)
LACTATE SERPL-SCNC: 2 MMOL/L (ref 0.5–2.2)
LYMPHOCYTES # BLD AUTO: 1.5 K/UL (ref 1–4.8)
LYMPHOCYTES NFR BLD: 16.5 % (ref 18–48)
MAGNESIUM SERPL-MCNC: 1.8 MG/DL (ref 1.6–2.6)
MCH RBC QN AUTO: 27 PG (ref 27–31)
MCHC RBC AUTO-ENTMCNC: 31.4 G/DL (ref 32–36)
MCV RBC AUTO: 86 FL (ref 82–98)
MONOCYTES # BLD AUTO: 1 K/UL (ref 0.3–1)
MONOCYTES NFR BLD: 10.9 % (ref 4–15)
NEUTROPHILS # BLD AUTO: 5.7 K/UL (ref 1.8–7.7)
NEUTROPHILS NFR BLD: 60.8 % (ref 38–73)
NRBC BLD-RTO: 0 /100 WBC
PLATELET # BLD AUTO: 220 K/UL (ref 150–450)
PMV BLD AUTO: 10.2 FL (ref 9.2–12.9)
POTASSIUM SERPL-SCNC: 3.6 MMOL/L (ref 3.5–5.1)
PROT SERPL-MCNC: 6 G/DL (ref 6–8.4)
RBC # BLD AUTO: 3.96 M/UL (ref 4–5.4)
SODIUM SERPL-SCNC: 141 MMOL/L (ref 136–145)
WBC # BLD AUTO: 9.3 K/UL (ref 3.9–12.7)

## 2022-03-16 PROCEDURE — 80053 COMPREHEN METABOLIC PANEL: CPT | Performed by: INTERNAL MEDICINE

## 2022-03-16 PROCEDURE — 82140 ASSAY OF AMMONIA: CPT | Performed by: NURSE PRACTITIONER

## 2022-03-16 PROCEDURE — 63600175 PHARM REV CODE 636 W HCPCS: Performed by: FAMILY MEDICINE

## 2022-03-16 PROCEDURE — 96361 HYDRATE IV INFUSION ADD-ON: CPT

## 2022-03-16 PROCEDURE — 83735 ASSAY OF MAGNESIUM: CPT | Performed by: INTERNAL MEDICINE

## 2022-03-16 PROCEDURE — 36415 COLL VENOUS BLD VENIPUNCTURE: CPT | Performed by: INTERNAL MEDICINE

## 2022-03-16 PROCEDURE — 25000003 PHARM REV CODE 250: Performed by: NURSE PRACTITIONER

## 2022-03-16 PROCEDURE — 25000003 PHARM REV CODE 250: Performed by: INTERNAL MEDICINE

## 2022-03-16 PROCEDURE — 25000003 PHARM REV CODE 250: Performed by: FAMILY MEDICINE

## 2022-03-16 PROCEDURE — 36415 COLL VENOUS BLD VENIPUNCTURE: CPT | Performed by: NURSE PRACTITIONER

## 2022-03-16 PROCEDURE — 85025 COMPLETE CBC W/AUTO DIFF WBC: CPT | Performed by: INTERNAL MEDICINE

## 2022-03-16 PROCEDURE — 83605 ASSAY OF LACTIC ACID: CPT | Performed by: NURSE PRACTITIONER

## 2022-03-16 PROCEDURE — 96366 THER/PROPH/DIAG IV INF ADDON: CPT

## 2022-03-16 PROCEDURE — S5010 5% DEXTROSE AND 0.45% SALINE: HCPCS | Performed by: NURSE PRACTITIONER

## 2022-03-16 PROCEDURE — G0378 HOSPITAL OBSERVATION PER HR: HCPCS

## 2022-03-16 RX ORDER — ZINC SULFATE 50(220)MG
220 CAPSULE ORAL NIGHTLY
Status: DISCONTINUED | OUTPATIENT
Start: 2022-03-16 | End: 2022-03-20 | Stop reason: HOSPADM

## 2022-03-16 RX ORDER — LANOLIN ALCOHOL/MO/W.PET/CERES
400 CREAM (GRAM) TOPICAL DAILY
Status: DISCONTINUED | OUTPATIENT
Start: 2022-03-16 | End: 2022-03-20 | Stop reason: HOSPADM

## 2022-03-16 RX ORDER — POTASSIUM CHLORIDE 20 MEQ/1
20 TABLET, EXTENDED RELEASE ORAL ONCE
Status: DISCONTINUED | OUTPATIENT
Start: 2022-03-16 | End: 2022-03-20 | Stop reason: HOSPADM

## 2022-03-16 RX ORDER — DEXTROSE MONOHYDRATE AND SODIUM CHLORIDE 5; .45 G/100ML; G/100ML
INJECTION, SOLUTION INTRAVENOUS CONTINUOUS
Status: DISCONTINUED | OUTPATIENT
Start: 2022-03-16 | End: 2022-03-20 | Stop reason: HOSPADM

## 2022-03-16 RX ORDER — ASCORBIC ACID 500 MG
500 TABLET ORAL 2 TIMES DAILY
Status: DISCONTINUED | OUTPATIENT
Start: 2022-03-16 | End: 2022-03-20 | Stop reason: HOSPADM

## 2022-03-16 RX ADMIN — CEFEPIME HYDROCHLORIDE 2 G: 2 INJECTION, SOLUTION INTRAVENOUS at 03:03

## 2022-03-16 RX ADMIN — OXYCODONE HYDROCHLORIDE AND ACETAMINOPHEN 500 MG: 500 TABLET ORAL at 11:03

## 2022-03-16 RX ADMIN — VANCOMYCIN HYDROCHLORIDE 2000 MG: 500 INJECTION, POWDER, LYOPHILIZED, FOR SOLUTION INTRAVENOUS at 10:03

## 2022-03-16 RX ADMIN — OXYCODONE HYDROCHLORIDE AND ACETAMINOPHEN 500 MG: 500 TABLET ORAL at 08:03

## 2022-03-16 RX ADMIN — CEFEPIME HYDROCHLORIDE 2 G: 2 INJECTION, SOLUTION INTRAVENOUS at 11:03

## 2022-03-16 RX ADMIN — DEXTROSE AND SODIUM CHLORIDE: 5; .45 INJECTION, SOLUTION INTRAVENOUS at 06:03

## 2022-03-16 RX ADMIN — ZINC SULFATE 220 MG (50 MG) CAPSULE 220 MG: CAPSULE at 08:03

## 2022-03-16 RX ADMIN — SODIUM CHLORIDE: 0.9 INJECTION, SOLUTION INTRAVENOUS at 09:03

## 2022-03-16 RX ADMIN — CEFEPIME HYDROCHLORIDE 2 G: 2 INJECTION, SOLUTION INTRAVENOUS at 09:03

## 2022-03-16 NOTE — H&P
[facility] Medicine  History & Physical    Patient Name: Michela Arriaza  MRN: [id number]  Patient Class: OP- Observation  Admission Date: 3/15/2022  Attending Physician: Rios Soriano MD   Primary Care Provider: Anatoliy Mayberry MD         Patient information was obtained from patient, relative(s), past medical records and ER records.     Subjective:     Principal Problem:Acute metabolic encephalopathy    Chief Complaint:   Chief Complaint   Patient presents with    Wound Infection     Sent from [facility] for sacral wound eval        HPI: Ms. Arriaza is a 72-year-old  female with a history of dementia, HTN, currently resident of nursing home, known history of sacral decubitus ulcer, was evaluated by a physician at the nursing home earlier today.  She was advised to go to the emergency department for further evaluation of worsening sacral decubitus wound.  Most of the information obtained from patient's son at the bedside.  Patient is currently on oral antibiotic (unknown medication) for UTI.  In the ED she is afebrile.  HR 84-94.  /80.  WBC 65661. 0% bands.  Lactic acid mildly elevated 2.5.  Procalcitonin 0.39.  UA is unremarkable (has been on oral antibiotics recently).  Patient is currently confused, and per family members at the bedside, though she has dementia, her current mental status is not normal.  Usually she is talkative and able to maintain some conversation.  Currently disoriented, unable to follow commands. CT head is negative for acute intracranial pathology.  Started on IV vancomycin, IV cefepime.    Admitting diagnosis:  Acute encephalopathy.  Sepsis secondary to sacral decubitus ulcer.  Recent UTI.  Advanced dementia.      Past Medical History:   Diagnosis Date    Hyperlipidemia     Hypertension        History reviewed. No pertinent surgical history.    Review of patient's allergies indicates:   Allergen Reactions    Iodine and iodide containing products      Levofloxacin Other (See Comments)       No current facility-administered medications on file prior to encounter.     Current Outpatient Medications on File Prior to Encounter   Medication Sig    ALPRAZolam (XANAX) 0.25 MG tablet Take 1 tablet (0.25 mg total) by mouth 3 (three) times daily as needed for Anxiety or Insomnia.    aspirin (ECOTRIN) 81 MG EC tablet Take 1 tablet (81 mg total) by mouth once daily.    calcium carbonate (OS-BELIA) 600 mg calcium (1,500 mg) Tab Take 1 tablet (600 mg total) by mouth once daily.    multivit with min-folic acid 0.4 mg Tab Take 1 tablet by mouth once daily.    polyethylene glycol (GLYCOLAX) 17 gram/dose powder Take 17 g by mouth once daily.    acetaminophen (TYLENOL) 500 mg Cap Take 1 capsule (500 mg total) by mouth every 6 (six) hours as needed (pain, headache, temp >101).    amLODIPine (NORVASC) 5 MG tablet Take 5 mg by mouth once daily.    docusate sodium (COLACE) 100 MG capsule Take 1 capsule (100 mg total) by mouth 3 (three) times daily as needed.    HYDROcodone-acetaminophen (NORCO) 5-325 mg per tablet Take 1 tablet by mouth every 8 (eight) hours as needed for Pain.    losartan (COZAAR) 50 MG tablet Take 50 mg by mouth once daily.    pravastatin (PRAVACHOL) 40 MG tablet Take 1 tablet (40 mg total) by mouth once daily.     Family History    Reviewed and Not Pertinent       Tobacco Use    Smoking status: Former Smoker    Smokeless tobacco: Never Used   Substance and Sexual Activity    Alcohol use: No     Alcohol/week: 0.0 standard drinks    Drug use: No    Sexual activity: Yes     Partners: Male     Review of Systems   Unable to perform ROS: Mental status change   Objective:     Vital Signs (Most Recent):  Temp: 99.5 °F (37.5 °C) (03/15/22 1820)  Pulse: 84 (03/15/22 1957)  Resp: 20 (03/15/22 1957)  BP: 134/68 (03/15/22 1957)  SpO2: 97 % (03/15/22 1957) Vital Signs (24h Range):  Temp:  [99.5 °F (37.5 °C)] 99.5 °F (37.5 °C)  Pulse:  [84-88] 84  Resp:  [18-20]  20  SpO2:  [97 %-98 %] 97 %  BP: (132-134)/(68-94) 134/68     Weight: 70.3 kg (154 lb 15.7 oz)  Body mass index is 25.79 kg/m².    Physical Exam  Vitals and nursing note reviewed.   Constitutional:       General: She is not in acute distress.     Appearance: She is ill-appearing.   HENT:      Head: Normocephalic and atraumatic.      Mouth/Throat:      Mouth: Mucous membranes are moist.   Eyes:      General: No scleral icterus.     Conjunctiva/sclera: Conjunctivae normal.   Cardiovascular:      Rate and Rhythm: Normal rate and regular rhythm.      Heart sounds: No murmur heard.  Pulmonary:      Effort: Pulmonary effort is normal. No respiratory distress.      Breath sounds: Normal breath sounds.   Abdominal:      Palpations: Abdomen is soft.      Tenderness: There is no abdominal tenderness.   Musculoskeletal:         General: No swelling.      Cervical back: Normal range of motion.      Comments: Large decubitus ulcer in the sacral region,, with bandage.   Skin:     Coloration: Skin is not jaundiced.   Neurological:      Mental Status: She is alert. She is disoriented.   Psychiatric:         Attention and Perception: Attention normal.         Speech: Speech normal.         Behavior: Behavior is uncooperative.           Significant Labs: All pertinent labs within the past 24 hours have been reviewed.  Recent Lab Results         03/15/22  2017   03/15/22  1924   03/15/22  1916        Procalcitonin     0.39  Comment: A concentration < 0.25 ng/mL represents a low risk of bacterial   infection.  Procalcitonin may not be accurate among patients with localized   infection, recent trauma or major surgery, immunosuppressed state,   invasive fungal infection, renal dysfunction. Decisions regarding   initiation or continuation of antibiotic therapy should not be based   solely on procalcitonin levels.         Albumin     2.5       Alkaline Phosphatase     106       ALT     19       Anion Gap     9       Appearance, UA    Clear         AST     29       Bacteria, UA   Occasional         Baso #     0.04       Basophil %     0.3       Bilirubin (UA)   Negative         BILIRUBIN TOTAL     0.3  Comment: For infants and newborns, interpretation of results should be based  on gestational age, weight and in agreement with clinical  observations.    Premature Infant recommended reference ranges:  Up to 24 hours.............<8.0 mg/dL  Up to 48 hours............<12.0 mg/dL  3-5 days..................<15.0 mg/dL  6-29 days.................<15.0 mg/dL         BUN     22       Calcium     8.9       Chloride     107       CO2     27       Color, UA   Yellow         Creatinine     0.8       Differential Method     Automated       eGFR if      >60       eGFR if non      >60  Comment: Calculation used to obtain the estimated glomerular filtration  rate (eGFR) is the CKD-EPI equation.          Eos #     0.7       Eosinophil %     6.2       Glucose     166       Glucose, UA   Negative         Gran # (ANC)     8.8       Gran %     74.0       Hematocrit     36.3       Hemoglobin     11.3       Hyaline Casts, UA   0         Immature Grans (Abs)     0.08  Comment: Mild elevation in immature granulocytes is non specific and   can be seen in a variety of conditions including stress response,   acute inflammation, trauma and pregnancy. Correlation with other   laboratory and clinical findings is essential.         Immature Granulocytes     0.7       Ketones, UA   Negative         Lactate, Mohit     2.5  Comment: Falsely low lactic acid results can be found in samples   containing >=13.0 mg/dL total bilirubin and/or >=3.5 mg/dL   direct bilirubin.         Leukocytes, UA   Negative         Lymph #     1.3       Lymph %     10.7       MCH     27.3       MCHC     31.1       MCV     88       Microscopic Comment   SEE COMMENT  Comment: Other formed elements not mentioned in the report are not   present in the microscopic examination.             Mono #     1.0       Mono %     8.1       MPV     10.3       NITRITE UA   Negative         nRBC     0       Occult Blood UA   2+         pH, UA   5.0         Platelets     302       Potassium     4.0       PROTEIN TOTAL     6.7       Protein, UA   1+  Comment: Recommend a 24 hour urine protein or a urine   protein/creatinine ratio if globulin induced proteinuria is  clinically suspected.            Acceptable Yes           RBC     4.14       RBC, UA   6         RDW     13.9       SARS-CoV-2 RNA, Amplification, Qual Negative           Sodium     143       Specific Gravity, UA   >=1.030         Specimen UA   Urine, Catheterized         Squam Epithel, UA   3         Troponin I     0.007  Comment: The reference interval for Troponin I represents the 99th percentile   cutoff   for our facility and is consistent with 3rd generation assay   performance.         UROBILINOGEN UA   Negative         WBC, UA   1         WBC     11.91               Significant Imaging: I have reviewed all pertinent imaging results/findings within the past 24 hours.  I have reviewed and interpreted all pertinent imaging results/findings within the past 24 hours.    Imaging Results              CT Head Without Contrast (Final result)  Result time 03/15/22 19:12:06      Final result by Albert Fulton MD (03/15/22 19:12:06)                   Impression:      No acute abnormality.    Atrophy and chronic white matter changes    Mild motion artifacts.    All CT scans   are performed using dose optimization techniques including the following: automated exposure control; adjustment of the mA and/or kV; use of iterative reconstruction technique.  Dose modulation was employed for ALARA by means of: Automated exposure control; adjustment of the mA and/or kV according to patient size (this includes techniques or standardized protocols for targeted exams where dose is matched to indication/reason for exam; i.e. extremities or head);  and/or use of iterative reconstructive technique.      Electronically signed by: Donaldo Price  Date:    03/15/2022  Time:    19:12               Narrative:    EXAMINATION:  CT HEAD WITHOUT CONTRAST    CLINICAL HISTORY:  Mental status change, unknown cause;    TECHNIQUE:  Low dose axial CT images obtained throughout the head without intravenous contrast. Sagittal and coronal reconstructions were performed.    COMPARISON:  None.    FINDINGS:  Mild motion artifacts    No parenchymal mass, hemorrhage, edema or major vascular distribution infarct.    Atrophy and chronic white matter changes                                       X-Ray Chest AP Portable (Final result)  Result time 03/15/22 18:55:20      Final result by Albert Fulton MD (03/15/22 18:55:20)                   Impression:      No acute abnormality.  Cardiomegaly.  Mild subsegmental atelectasis.      Electronically signed by: Donaldo Price  Date:    03/15/2022  Time:    18:55               Narrative:    EXAMINATION:  XR CHEST AP PORTABLE    CLINICAL HISTORY:  Sepsis;    TECHNIQUE:  Single frontal view of the chest was performed.    COMPARISON:  None    FINDINGS:  Mild subsegmental atelectasis.The lungs are clear, with normal appearance of pulmonary vasculature and no pleural effusion or pneumothorax.    The cardiac silhouette is mildly enlarged.  The hilar and mediastinal contours are unremarkable.    Bones are intact.                                      I have independently reviewed all pertinent labs within the past 24 hours.    I have independently reviewed, visualized and interpreted all pertinent imaging results within the past 24 hours and discussed the findings with the ED physician, Dr. Vigil          Assessment/Plan:     * Acute metabolic encephalopathy  -AMS likely secondary to underlying sacral decubitus ulcer/sepsis.  In setting of dementia.  -CT head negative for acute intracranial pathology.  -neurochecks every 4 hours.      Sepsis  This  "patient does have evidence of infective focus  My overall impression is sepsis. Vital signs were reviewed and noted in progress note.  Antibiotics given-   Antibiotics (From admission, onward)            Start     Stop Route Frequency Ordered    03/16/22 0000  cefepime in dextrose 5 % IVPB 2 g         -- IV Every 8 hours (non-standard times) 03/15/22 2145    03/15/22 2230  vancomycin 1.75 g in 5 % dextrose 500 mL IVPB         -- IV Once 03/15/22 2102    03/15/22 2156  vancomycin - pharmacy to dose  (vancomycin IVPB)        "And" Linked Group Details    -- IV pharmacy to manage frequency 03/15/22 2058        Cultures were taken-   Microbiology Results (last 7 days)     Procedure Component Value Units Date/Time    Blood culture x two cultures. Draw prior to antibiotics. [[id number]] Collected: 03/15/22 1931    Order Status: Sent Specimen: Blood from Peripheral, Antecubital, Left Updated: 03/15/22 1931    Blood culture x two cultures. Draw prior to antibiotics. [[id number]] Collected: 03/15/22 1917    Order Status: Sent Specimen: Blood from Peripheral, Antecubital, Right Updated: 03/15/22 1917        Latest lactate reviewed, they are-  Recent Labs   Lab 03/15/22  1916   LACTATE 2.5*       Organ dysfunction indicated by Encephalopathy   Source- Sacral Wound    Source control Achieved by- IV abx    -afebrile.  HR 84-94.  WBC 37349. Lactic acid 2.5.  -source of infection likely sacral decubitus ulcer/wound.  -hemodynamically stable.  -continue IV vancomycin, IV cefepime empirically.  -follow up on cultures.      Decubitus ulcer of sacral region  -Per report, stage IV sacral decubitus ulcer.  -IV vancomycin, IV cefepime.  -wound care consult.          Dementia without behavioral disturbance           VTE Risk Mitigation (From admission, onward)         Ordered     Place sequential compression device  Until discontinued         03/15/22 2058                 The patient is placed in OBSERVATION status.      Thuan Pena, " MD  Department of Hospital Medicine   [facility]

## 2022-03-16 NOTE — HOSPITAL COURSE
The patient was monitored closely during her stay. She was kept on continuous telemetry monitoring. She was given IVF for hydration and started on IV Abx for Infected sacral wound. Wound care was consulted for evaluation and recommendations of her sacral wound.   3/16 Consult General surgery for I&D of sacral wound  As of 3/17/2022, vital signs stable. Continuing IV antibiotics for sacral decubitus. General Surgery consulted, reviewed case with Dr Luque, who thinks palliative care conversation with family is needed. Patient is not a surgical candidate. Will continue wound care, as well. As of 3/18/22 no change in status. Continue current medical management plan. Palliative care had lengthy discussion with family today. At this point, they are leaning towards hospice but did not commit.  They want to meet with the hospice team first and discuss in more detail as a family before making a decision. They plan to meet with hospice tomorrow. As of 3/19/22 no change in status. Family has decided that they aren't ready to put patient on hospice. Will plan on discharging back to NH soon. As of 3/20/22 pt stable no new issues. Patient seen and examined on the date of discharge and found stable for discharge. Home meds reconciled. Zyvox prescription given. Wound care ordered prescribed. Patient to follow-up with PCP in 3 days for hospital follow-up.

## 2022-03-16 NOTE — ASSESSMENT & PLAN NOTE
3/15 -AMS likely secondary to underlying sacral decubitus ulcer/sepsis.  In setting of dementia.  -CT head negative for acute intracranial pathology.  -neurochecks every 4 hours.

## 2022-03-16 NOTE — PROGRESS NOTES
Pharmacist Renal Dose Adjustment Note    Michela Arriaza is a 72 y.o. female being treated with the medication cefepime    Patient Data:    Vital Signs (Most Recent):  Temp: 96.7 °F (35.9 °C) (03/15/22 2141)  Pulse: 94 (03/15/22 2141)  Resp: 18 (03/15/22 2141)  BP: 136/80 (03/15/22 2141)  SpO2: 99 % (03/15/22 2141) Vital Signs (72h Range):  Temp:  [96.7 °F (35.9 °C)-99.5 °F (37.5 °C)]   Pulse:  [84-94]   Resp:  [18-20]   BP: (132-136)/(68-94)   SpO2:  [97 %-99 %]      Recent Labs   Lab 03/15/22  1916   CREATININE 0.8     Serum creatinine: 0.8 mg/dL 03/15/22 1916  Estimated creatinine clearance: 61.6 mL/min    Medication: cefepime 1 gm iv q8hrs will be changed to medication: cefepime 2 gm iv q8hrs per renal dosing protocol  Pharmacist's Name: Alta Reyes [facility]  Pharmacist's Extension: [phone]

## 2022-03-16 NOTE — ASSESSMENT & PLAN NOTE
-Per report, stage IV sacral decubitus ulcer.  -IV vancomycin, IV cefepime.  -wound care consult.

## 2022-03-16 NOTE — ASSESSMENT & PLAN NOTE
"This patient does have evidence of infective focus  My overall impression is severe  sepsis. Vital signs were reviewed and noted in progress note.  Antibiotics given-   Antibiotics (From admission, onward)            Start     Stop Route Frequency Ordered    03/16/22 2200  vancomycin 2 g in dextrose 5 % 500 mL IVPB         -- IV Every 24 hours (non-standard times) 03/15/22 2315    03/16/22 0345  cefepime in dextrose 5 % IVPB 2 g         -- IV Every 8 hours (non-standard times) 03/15/22 2145    03/15/22 2156  vancomycin - pharmacy to dose  (vancomycin IVPB)        "And" Linked Group Details    -- IV pharmacy to manage frequency 03/15/22 2058        Cultures were taken-   Microbiology Results (last 7 days)     Procedure Component Value Units Date/Time    Blood culture x two cultures. Draw prior to antibiotics. [[id number]] Collected: 03/15/22 1917    Order Status: Completed Specimen: Blood from Peripheral, Antecubital, Right Updated: 03/16/22 1115     Blood Culture, Routine No Growth to date    Narrative:      Aerobic and anaerobic    Blood culture x two cultures. Draw prior to antibiotics. [[id number]] Collected: 03/15/22 1931    Order Status: Completed Specimen: Blood from Peripheral, Antecubital, Left Updated: 03/16/22 1115     Blood Culture, Routine No Growth to date    Narrative:      Aerobic and anaerobic        Latest lactate reviewed, they are-  Recent Labs   Lab 03/15/22  1916 03/15/22  2233 03/16/22  1125   LACTATE 2.5* 2.3* 2.0       Organ dysfunction indicated by Encephalopathy   Source- Sacral Wound    Source control Achieved by- IV abx    3/15 -afebrile.  HR 84-94.  WBC 15120. Lactic acid 2.5.  -source of infection likely sacral decubitus ulcer/wound.  -hemodynamically stable.  -continue IV vancomycin, IV cefepime empirically.  -follow up on cultures.    3/16 Continue current treatment  Consult General surgery for I&D    "

## 2022-03-16 NOTE — ASSESSMENT & PLAN NOTE
3/15 -Per report, stage IV sacral decubitus ulcer.  -IV vancomycin, IV cefepime.  -wound care consult.  3/16 Add Zinc and Vit C

## 2022-03-16 NOTE — PROGRESS NOTES
[facility]– [facility]  Progress Note    Patient Name: Michela Arriaza  MRN: [id number]  Patient Class: OP- Observation   Admission Date: 3/15/2022  Length of Stay: 0 days  Attending Physician: Rita Wong MD  Primary Care Provider: Anatoliy Mayberry MD      Subjective:     Principal Problem: Severe Sepsis secondary to infected sacral wound, Acute metabolic encephalopathy      HPI:  Ms. Arriaza is a 72-year-old  female with a history of dementia, HTN, currently resident of nursing home, known history of sacral decubitus ulcer, was evaluated by a physician at the nursing home earlier today.  She was advised to go to the emergency department for further evaluation of worsening sacral decubitus wound.  Most of the information obtained from patient's son at the bedside.  Patient is currently on oral antibiotic (unknown medication) for UTI.  In the ED she is afebrile.  HR 84-94.  /80.  WBC 98439. 0% bands.  Lactic acid mildly elevated 2.5.  Procalcitonin 0.39.  UA is unremarkable (has been on oral antibiotics recently).  Patient is currently confused, and per family members at the bedside, though she has dementia, her current mental status is not normal.  Usually she is talkative and able to maintain some conversation.  Currently disoriented, unable to follow commands. CT head is negative for acute intracranial pathology.  Started on IV vancomycin, IV cefepime.    Admitting diagnosis:  Acute encephalopathy.  Sepsis secondary to sacral decubitus ulcer.  Recent UTI.  Advanced dementia.      Overview/Hospital Course:  The patient was monitored closely during her stay. She was kept on continuous telemetry monitoring. She was given IVF for hydration and started on IV Abx for Infected sacral wound. Wound care was consulted for evaluation and recommendations of her sacral wound.   3/16 Consult General surgery for I&D of sacral wound      Interval History:  She was given IVF for hydration and  started on IV Abx for Suspected infection sacral wound. Wound care was consulted for evaluation and recommendations of her sacral wound.       Review of Systems   Constitutional:  Negative for fever.        Chronic debility   HENT:  Negative for ear discharge, ear pain and facial swelling.    Eyes:  Negative for pain and redness.   Respiratory:  Negative for cough and shortness of breath.    Cardiovascular:  Positive for leg swelling.   Gastrointestinal:  Negative for abdominal pain, constipation, diarrhea, nausea and vomiting.   Endocrine: Negative for polydipsia and polyphagia.   Genitourinary:  Negative for hematuria.   Musculoskeletal:  Positive for gait problem. Negative for neck pain and neck stiffness.   Skin:  Positive for wound.        Sacral   Allergic/Immunologic: Negative for food allergies.   Neurological:  Positive for weakness. Negative for facial asymmetry.   Hematological:  Does not bruise/bleed easily.   Psychiatric/Behavioral:  Positive for confusion.    Objective:     Vital Signs (Most Recent):  Temp: 96.8 °F (36 °C) (03/16/22 1540)  Pulse: 84 (03/16/22 1540)  Resp: 18 (03/16/22 1540)  BP: 131/80 (03/16/22 1540)  SpO2: 98 % (03/16/22 1540) Vital Signs (24h Range):  Temp:  [96.7 °F (35.9 °C)-99.5 °F (37.5 °C)] 96.8 °F (36 °C)  Pulse:  [81-94] 84  Resp:  [14-20] 18  SpO2:  [94 %-99 %] 98 %  BP: (113-161)/(56-94) 131/80     Weight: 68.9 kg (151 lb 14.4 oz)  Body mass index is 25.28 kg/m².    Intake/Output Summary (Last 24 hours) at 3/16/2022 1657  Last data filed at 3/15/2022 2114  Gross per 24 hour   Intake 1050 ml   Output --   Net 1050 ml      Physical Exam  Vitals and nursing note reviewed.   Constitutional:       General: She is not in acute distress.     Appearance: She is not ill-appearing or diaphoretic.   HENT:      Head: Normocephalic and atraumatic.      Mouth/Throat:      Mouth: Mucous membranes are dry.   Eyes:      General: No scleral icterus.        Right eye: No discharge.          Left eye: No discharge.      Conjunctiva/sclera: Conjunctivae normal.   Cardiovascular:      Rate and Rhythm: Normal rate and regular rhythm.      Pulses: Normal pulses.      Heart sounds: No murmur heard.  Pulmonary:      Effort: Pulmonary effort is normal. No respiratory distress.      Breath sounds: No wheezing or rhonchi.      Comments: BBS diminished  Abdominal:      General: Bowel sounds are normal. There is no distension.      Palpations: Abdomen is soft.      Tenderness: There is no abdominal tenderness. There is no guarding.   Genitourinary:     Comments: Not examined  Musculoskeletal:      Cervical back: Normal range of motion and neck supple. No rigidity or tenderness.      Right lower leg: Edema present.      Left lower leg: Edema present.      Comments: Stiff joints  General debility  Scant BLE edema          Skin:     General: Skin is warm and dry.      Capillary Refill: Capillary refill takes 2 to 3 seconds.      Comments: Sacral dressing   Neurological:      Mental Status: She is alert. She is disoriented.      Motor: Weakness present.      Comments: Oriented to person only  Confused   Psychiatric:         Attention and Perception: Attention normal.         Speech: Speech normal.      Comments: Calm and cooperative at this time          Lab Results   Component Value Date    WBC 9.30 03/16/2022    HGB 10.7 (L) 03/16/2022    HCT 34.1 (L) 03/16/2022    MCV 86 03/16/2022     03/16/2022       BMP  Lab Results   Component Value Date     03/16/2022    K 3.6 03/16/2022     (H) 03/16/2022    CO2 22 (L) 03/16/2022    BUN 19 03/16/2022    CREATININE 0.6 03/16/2022    CALCIUM 8.5 (L) 03/16/2022    ANIONGAP 8 03/16/2022    ESTGFRAFRICA >60 03/16/2022    EGFRNONAA >60 03/16/2022           Assessment/Plan:      * Acute metabolic encephalopathy  3/15 -AMS likely secondary to underlying sacral decubitus ulcer/sepsis.  In setting of dementia.  -CT head negative for acute intracranial  "pathology.  -neurochecks every 4 hours.      Hypoalbuminemia  3/16 Add po supplement      Normocytic anemia  3/16 Monitor      Decubitus ulcer of sacral region  3/15 -Per report, stage IV sacral decubitus ulcer.  -IV vancomycin, IV cefepime.  -wound care consult.  3/16 Add Zinc and Vit C          Severe sepsis secondary to Infected sacral decubitus  This patient does have evidence of infective focus  My overall impression is severe  sepsis. Vital signs were reviewed and noted in progress note.  Antibiotics given-   Antibiotics (From admission, onward)            Start     Stop Route Frequency Ordered    03/16/22 2200  vancomycin 2 g in dextrose 5 % 500 mL IVPB         -- IV Every 24 hours (non-standard times) 03/15/22 2315    03/16/22 0345  cefepime in dextrose 5 % IVPB 2 g         -- IV Every 8 hours (non-standard times) 03/15/22 2145    03/15/22 2156  vancomycin - pharmacy to dose  (vancomycin IVPB)        "And" Linked Group Details    -- IV pharmacy to manage frequency 03/15/22 2058        Cultures were taken-   Microbiology Results (last 7 days)     Procedure Component Value Units Date/Time    Blood culture x two cultures. Draw prior to antibiotics. [[id number]] Collected: 03/15/22 1917    Order Status: Completed Specimen: Blood from Peripheral, Antecubital, Right Updated: 03/16/22 1115     Blood Culture, Routine No Growth to date    Narrative:      Aerobic and anaerobic    Blood culture x two cultures. Draw prior to antibiotics. [[id number]] Collected: 03/15/22 1931    Order Status: Completed Specimen: Blood from Peripheral, Antecubital, Left Updated: 03/16/22 1115     Blood Culture, Routine No Growth to date    Narrative:      Aerobic and anaerobic        Latest lactate reviewed, they are-  Recent Labs   Lab 03/15/22  1916 03/15/22  2233 03/16/22  1125   LACTATE 2.5* 2.3* 2.0       Organ dysfunction indicated by Encephalopathy   Source- Sacral Wound    Source control Achieved by- IV abx    3/15 -afebrile.  HR " 84-94.  WBC 40387. Lactic acid 2.5.  -source of infection likely sacral decubitus ulcer/wound.  -hemodynamically stable.  -continue IV vancomycin, IV cefepime empirically.  -follow up on cultures.    3/16 Continue current treatment  Consult General surgery for I&D      Dementia without behavioral disturbance with Debility  3/16 Fall and skin precautions  Turn Q 2 hours              VTE Risk Mitigation (From admission, onward)         Ordered     Place sequential compression device  Until discontinued         03/15/22 2058                Discharge Planning   BERNA:      Code Status: Prior   Is the patient medically ready for discharge?:     Reason for patient still in hospital (select all that apply): Treatment  Discharge Plan A: Return to nursing home        Time spent seeing patient( greater than 1/2 spent in direct contact) :  38 minutes    MARÍA ELENA Starr  Department of Hospital Medicine   [facility] - Med Surg

## 2022-03-16 NOTE — PROGRESS NOTES
Pharmacokinetic Initial Assessment: IV Vancomycin    Assessment/Plan:    Initiate intravenous vancomycin with loading dose of 1750 mg once followed by a maintenance dose of vancomycin 2000mg IV every 24 hours  Desired empiric serum trough concentration is 10 to 15 mcg/mL  Draw vancomycin trough level 60 min prior to third dose on 3/17 at approximately 21:00  Pharmacy will continue to follow and monitor vancomycin.      Please contact pharmacy at extension [phone] with any questions regarding this assessment.     Thank you for the consult,   Олег Stratton       Patient brief summary:  Michela Arriaza is a 72 y.o. female initiated on antimicrobial therapy with IV Vancomycin for treatment of suspected skin & soft tissue infection    Drug Allergies:   Review of patient's allergies indicates:   Allergen Reactions    Iodine and iodide containing products     Levofloxacin Other (See Comments)       Actual Body Weight:   70.3kg    Renal Function:   Estimated Creatinine Clearance: 61.6 mL/min (based on SCr of 0.8 mg/dL).,     Dialysis Method (if applicable):  N/A    CBC (last 72 hours):  Recent Labs   Lab Result Units 03/15/22  1916   WBC K/uL 11.91   Hemoglobin g/dL 11.3*   Hematocrit % 36.3*   Platelets K/uL 302   Gran % % 74.0*   Lymph % % 10.7*   Mono % % 8.1   Eosinophil % % 6.2   Basophil % % 0.3   Differential Method  Automated       Metabolic Panel (last 72 hours):  Recent Labs   Lab Result Units 03/15/22  1916 03/15/22  1924   Sodium mmol/L 143  --    Potassium mmol/L 4.0  --    Chloride mmol/L 107  --    CO2 mmol/L 27  --    Glucose mg/dL 166*  --    Glucose, UA   --  Negative   BUN mg/dL 22  --    Creatinine mg/dL 0.8  --    Albumin g/dL 2.5*  --    Total Bilirubin mg/dL 0.3  --    Alkaline Phosphatase U/L 106  --    AST U/L 29  --    ALT U/L 19  --        Drug levels (last 3 results):  No results for input(s): VANCOMYCINRA, VANCOMYCINPE, VANCOMYCINTR in the last 72 hours.    Microbiologic  Results:  Microbiology Results (last 7 days)       Procedure Component Value Units Date/Time    Blood culture x two cultures. Draw prior to antibiotics. [[id number]] Collected: 03/15/22 1931    Order Status: Sent Specimen: Blood from Peripheral, Antecubital, Left Updated: 03/15/22 1931    Blood culture x two cultures. Draw prior to antibiotics. [[id number]] Collected: 03/15/22 1917    Order Status: Sent Specimen: Blood from Peripheral, Antecubital, Right Updated: 03/15/22 1917

## 2022-03-16 NOTE — ASSESSMENT & PLAN NOTE
"This patient does have evidence of infective focus  My overall impression is sepsis. Vital signs were reviewed and noted in progress note.  Antibiotics given-   Antibiotics (From admission, onward)            Start     Stop Route Frequency Ordered    03/16/22 0000  cefepime in dextrose 5 % IVPB 2 g         -- IV Every 8 hours (non-standard times) 03/15/22 2145    03/15/22 2230  vancomycin 1.75 g in 5 % dextrose 500 mL IVPB         -- IV Once 03/15/22 2102    03/15/22 2156  vancomycin - pharmacy to dose  (vancomycin IVPB)        "And" Linked Group Details    -- IV pharmacy to manage frequency 03/15/22 2058        Cultures were taken-   Microbiology Results (last 7 days)     Procedure Component Value Units Date/Time    Blood culture x two cultures. Draw prior to antibiotics. [[id number]] Collected: 03/15/22 1931    Order Status: Sent Specimen: Blood from Peripheral, Antecubital, Left Updated: 03/15/22 1931    Blood culture x two cultures. Draw prior to antibiotics. [[id number]] Collected: 03/15/22 1917    Order Status: Sent Specimen: Blood from Peripheral, Antecubital, Right Updated: 03/15/22 1917        Latest lactate reviewed, they are-  Recent Labs   Lab 03/15/22  1916   LACTATE 2.5*       Organ dysfunction indicated by Encephalopathy   Source- Sacral Wound    Source control Achieved by- IV abx    -afebrile.  HR 84-94.  WBC 15545. Lactic acid 2.5.  -source of infection likely sacral decubitus ulcer/wound.  -hemodynamically stable.  -continue IV vancomycin, IV cefepime empirically.  -follow up on cultures.    "

## 2022-03-16 NOTE — HPI
Ms. Arriaza is a 72-year-old  female with a history of dementia, HTN, currently resident of nursing home, known history of sacral decubitus ulcer, was evaluated by a physician at the nursing home earlier today.  She was advised to go to the emergency department for further evaluation of worsening sacral decubitus wound.  Most of the information obtained from patient's son at the bedside.  Patient is currently on oral antibiotic (unknown medication) for UTI.  In the ED she is afebrile.  HR 84-94.  /80.  WBC 06229. 0% bands.  Lactic acid mildly elevated 2.5.  Procalcitonin 0.39.  UA is unremarkable (has been on oral antibiotics recently).  Patient is currently confused, and per family members at the bedside, though she has dementia, her current mental status is not normal.  Usually she is talkative and able to maintain some conversation.  Currently disoriented, unable to follow commands. CT head is negative for acute intracranial pathology.  Started on IV vancomycin, IV cefepime.    Admitting diagnosis:  Acute encephalopathy.  Sepsis secondary to sacral decubitus ulcer.  Recent UTI.  Advanced dementia.

## 2022-03-16 NOTE — PLAN OF CARE
Pt disoriented x3, NS @125mL/hr, tolerating well, remains free from falls/injuries this shift. Safety precautions maintained.  No s/s of acute distress noted. Will continue to monitor. Chart check completed.

## 2022-03-16 NOTE — SUBJECTIVE & OBJECTIVE
Past Medical History:   Diagnosis Date    Hyperlipidemia     Hypertension        History reviewed. No pertinent surgical history.    Review of patient's allergies indicates:   Allergen Reactions    Iodine and iodide containing products     Levofloxacin Other (See Comments)       No current facility-administered medications on file prior to encounter.     Current Outpatient Medications on File Prior to Encounter   Medication Sig    ALPRAZolam (XANAX) 0.25 MG tablet Take 1 tablet (0.25 mg total) by mouth 3 (three) times daily as needed for Anxiety or Insomnia.    aspirin (ECOTRIN) 81 MG EC tablet Take 1 tablet (81 mg total) by mouth once daily.    calcium carbonate (OS-BELIA) 600 mg calcium (1,500 mg) Tab Take 1 tablet (600 mg total) by mouth once daily.    multivit with min-folic acid 0.4 mg Tab Take 1 tablet by mouth once daily.    polyethylene glycol (GLYCOLAX) 17 gram/dose powder Take 17 g by mouth once daily.    acetaminophen (TYLENOL) 500 mg Cap Take 1 capsule (500 mg total) by mouth every 6 (six) hours as needed (pain, headache, temp >101).    amLODIPine (NORVASC) 5 MG tablet Take 5 mg by mouth once daily.    docusate sodium (COLACE) 100 MG capsule Take 1 capsule (100 mg total) by mouth 3 (three) times daily as needed.    HYDROcodone-acetaminophen (NORCO) 5-325 mg per tablet Take 1 tablet by mouth every 8 (eight) hours as needed for Pain.    losartan (COZAAR) 50 MG tablet Take 50 mg by mouth once daily.    pravastatin (PRAVACHOL) 40 MG tablet Take 1 tablet (40 mg total) by mouth once daily.     Family History    Reviewed and Not Pertinent       Tobacco Use    Smoking status: Former Smoker    Smokeless tobacco: Never Used   Substance and Sexual Activity    Alcohol use: No     Alcohol/week: 0.0 standard drinks    Drug use: No    Sexual activity: Yes     Partners: Male     Review of Systems   Unable to perform ROS: Mental status change   Objective:     Vital Signs (Most Recent):  Temp: 99.5 °F (37.5 °C) (03/15/22  1820)  Pulse: 84 (03/15/22 1957)  Resp: 20 (03/15/22 1957)  BP: 134/68 (03/15/22 1957)  SpO2: 97 % (03/15/22 1957) Vital Signs (24h Range):  Temp:  [99.5 °F (37.5 °C)] 99.5 °F (37.5 °C)  Pulse:  [84-88] 84  Resp:  [18-20] 20  SpO2:  [97 %-98 %] 97 %  BP: (132-134)/(68-94) 134/68     Weight: 70.3 kg (154 lb 15.7 oz)  Body mass index is 25.79 kg/m².    Physical Exam  Vitals and nursing note reviewed.   Constitutional:       General: She is not in acute distress.     Appearance: She is ill-appearing.   HENT:      Head: Normocephalic and atraumatic.      Mouth/Throat:      Mouth: Mucous membranes are moist.   Eyes:      General: No scleral icterus.     Conjunctiva/sclera: Conjunctivae normal.   Cardiovascular:      Rate and Rhythm: Normal rate and regular rhythm.      Heart sounds: No murmur heard.  Pulmonary:      Effort: Pulmonary effort is normal. No respiratory distress.      Breath sounds: Normal breath sounds.   Abdominal:      Palpations: Abdomen is soft.      Tenderness: There is no abdominal tenderness.   Musculoskeletal:         General: No swelling.      Cervical back: Normal range of motion.      Comments: Large decubitus ulcer in the sacral region,, with bandage.   Skin:     Coloration: Skin is not jaundiced.   Neurological:      Mental Status: She is alert. She is disoriented.   Psychiatric:         Attention and Perception: Attention normal.         Speech: Speech normal.         Behavior: Behavior is uncooperative.           Significant Labs: All pertinent labs within the past 24 hours have been reviewed.  Recent Lab Results         03/15/22  2017   03/15/22  1924   03/15/22  1916        Procalcitonin     0.39  Comment: A concentration < 0.25 ng/mL represents a low risk of bacterial   infection.  Procalcitonin may not be accurate among patients with localized   infection, recent trauma or major surgery, immunosuppressed state,   invasive fungal infection, renal dysfunction. Decisions regarding    initiation or continuation of antibiotic therapy should not be based   solely on procalcitonin levels.         Albumin     2.5       Alkaline Phosphatase     106       ALT     19       Anion Gap     9       Appearance, UA   Clear         AST     29       Bacteria, UA   Occasional         Baso #     0.04       Basophil %     0.3       Bilirubin (UA)   Negative         BILIRUBIN TOTAL     0.3  Comment: For infants and newborns, interpretation of results should be based  on gestational age, weight and in agreement with clinical  observations.    Premature Infant recommended reference ranges:  Up to 24 hours.............<8.0 mg/dL  Up to 48 hours............<12.0 mg/dL  3-5 days..................<15.0 mg/dL  6-29 days.................<15.0 mg/dL         BUN     22       Calcium     8.9       Chloride     107       CO2     27       Color, UA   Yellow         Creatinine     0.8       Differential Method     Automated       eGFR if      >60       eGFR if non      >60  Comment: Calculation used to obtain the estimated glomerular filtration  rate (eGFR) is the CKD-EPI equation.          Eos #     0.7       Eosinophil %     6.2       Glucose     166       Glucose, UA   Negative         Gran # (ANC)     8.8       Gran %     74.0       Hematocrit     36.3       Hemoglobin     11.3       Hyaline Casts, UA   0         Immature Grans (Abs)     0.08  Comment: Mild elevation in immature granulocytes is non specific and   can be seen in a variety of conditions including stress response,   acute inflammation, trauma and pregnancy. Correlation with other   laboratory and clinical findings is essential.         Immature Granulocytes     0.7       Ketones, UA   Negative         Lactate, Mohit     2.5  Comment: Falsely low lactic acid results can be found in samples   containing >=13.0 mg/dL total bilirubin and/or >=3.5 mg/dL   direct bilirubin.         Leukocytes, UA   Negative         Lymph #     1.3        Lymph %     10.7       MCH     27.3       MCHC     31.1       MCV     88       Microscopic Comment   SEE COMMENT  Comment: Other formed elements not mentioned in the report are not   present in the microscopic examination.            Mono #     1.0       Mono %     8.1       MPV     10.3       NITRITE UA   Negative         nRBC     0       Occult Blood UA   2+         pH, UA   5.0         Platelets     302       Potassium     4.0       PROTEIN TOTAL     6.7       Protein, UA   1+  Comment: Recommend a 24 hour urine protein or a urine   protein/creatinine ratio if globulin induced proteinuria is  clinically suspected.            Acceptable Yes           RBC     4.14       RBC, UA   6         RDW     13.9       SARS-CoV-2 RNA, Amplification, Qual Negative           Sodium     143       Specific Gravity, UA   >=1.030         Specimen UA   Urine, Catheterized         Squam Epithel, UA   3         Troponin I     0.007  Comment: The reference interval for Troponin I represents the 99th percentile   cutoff   for our facility and is consistent with 3rd generation assay   performance.         UROBILINOGEN UA   Negative         WBC, UA   1         WBC     11.91               Significant Imaging: I have reviewed all pertinent imaging results/findings within the past 24 hours.  I have reviewed and interpreted all pertinent imaging results/findings within the past 24 hours.    Imaging Results              CT Head Without Contrast (Final result)  Result time 03/15/22 19:12:06      Final result by Albert Fulton MD (03/15/22 19:12:06)                   Impression:      No acute abnormality.    Atrophy and chronic white matter changes    Mild motion artifacts.    All CT scans   are performed using dose optimization techniques including the following: automated exposure control; adjustment of the mA and/or kV; use of iterative reconstruction technique.  Dose modulation was employed for ALARA by means of: Automated  exposure control; adjustment of the mA and/or kV according to patient size (this includes techniques or standardized protocols for targeted exams where dose is matched to indication/reason for exam; i.e. extremities or head); and/or use of iterative reconstructive technique.      Electronically signed by: Donaldo Price  Date:    03/15/2022  Time:    19:12               Narrative:    EXAMINATION:  CT HEAD WITHOUT CONTRAST    CLINICAL HISTORY:  Mental status change, unknown cause;    TECHNIQUE:  Low dose axial CT images obtained throughout the head without intravenous contrast. Sagittal and coronal reconstructions were performed.    COMPARISON:  None.    FINDINGS:  Mild motion artifacts    No parenchymal mass, hemorrhage, edema or major vascular distribution infarct.    Atrophy and chronic white matter changes                                       X-Ray Chest AP Portable (Final result)  Result time 03/15/22 18:55:20      Final result by Albert Fulton MD (03/15/22 18:55:20)                   Impression:      No acute abnormality.  Cardiomegaly.  Mild subsegmental atelectasis.      Electronically signed by: Donaldo Price  Date:    03/15/2022  Time:    18:55               Narrative:    EXAMINATION:  XR CHEST AP PORTABLE    CLINICAL HISTORY:  Sepsis;    TECHNIQUE:  Single frontal view of the chest was performed.    COMPARISON:  None    FINDINGS:  Mild subsegmental atelectasis.The lungs are clear, with normal appearance of pulmonary vasculature and no pleural effusion or pneumothorax.    The cardiac silhouette is mildly enlarged.  The hilar and mediastinal contours are unremarkable.    Bones are intact.                                      I have independently reviewed all pertinent labs within the past 24 hours.    I have independently reviewed, visualized and interpreted all pertinent imaging results within the past 24 hours and discussed the findings with the ED physician, Dr. Vigil

## 2022-03-16 NOTE — PLAN OF CARE
Problem: Skin Injury Risk Increased  Goal: Skin Health and Integrity  Outcome: Ongoing, Progressing     Problem: Adult Inpatient Plan of Care  Goal: Plan of Care Review  Outcome: Ongoing, Progressing  Goal: Patient-Specific Goal (Individualized)  Outcome: Ongoing, Progressing  Goal: Absence of Hospital-Acquired Illness or Injury  Outcome: Ongoing, Progressing  Goal: Optimal Comfort and Wellbeing  Outcome: Ongoing, Progressing  Goal: Readiness for Transition of Care  Outcome: Ongoing, Progressing     Problem: Adjustment to Illness (Sepsis/Septic Shock)  Goal: Optimal Coping  Outcome: Ongoing, Progressing     Problem: Bleeding (Sepsis/Septic Shock)  Goal: Absence of Bleeding  Outcome: Ongoing, Progressing     Problem: Glycemic Control Impaired (Sepsis/Septic Shock)  Goal: Blood Glucose Level Within Desired Range  Outcome: Ongoing, Progressing     Problem: Infection Progression (Sepsis/Septic Shock)  Goal: Absence of Infection Signs and Symptoms  Outcome: Ongoing, Progressing     Problem: Nutrition Impaired (Sepsis/Septic Shock)  Goal: Optimal Nutrition Intake  Outcome: Ongoing, Progressing

## 2022-03-16 NOTE — SUBJECTIVE & OBJECTIVE
Interval History:  She was given IVF for hydration and started on IV Abx for Suspected infection sacral wound. Wound care was consulted for evaluation and recommendations of her sacral wound.       Review of Systems   Constitutional:  Negative for fever.        Chronic debility   HENT:  Negative for ear discharge, ear pain and facial swelling.    Eyes:  Negative for pain and redness.   Respiratory:  Negative for cough and shortness of breath.    Cardiovascular:  Positive for leg swelling.   Gastrointestinal:  Negative for abdominal pain, constipation, diarrhea, nausea and vomiting.   Endocrine: Negative for polydipsia and polyphagia.   Genitourinary:  Negative for hematuria.   Musculoskeletal:  Positive for gait problem. Negative for neck pain and neck stiffness.   Skin:  Positive for wound.        Sacral   Allergic/Immunologic: Negative for food allergies.   Neurological:  Positive for weakness. Negative for facial asymmetry.   Hematological:  Does not bruise/bleed easily.   Psychiatric/Behavioral:  Positive for confusion.    Objective:     Vital Signs (Most Recent):  Temp: 96.8 °F (36 °C) (03/16/22 1540)  Pulse: 84 (03/16/22 1540)  Resp: 18 (03/16/22 1540)  BP: 131/80 (03/16/22 1540)  SpO2: 98 % (03/16/22 1540) Vital Signs (24h Range):  Temp:  [96.7 °F (35.9 °C)-99.5 °F (37.5 °C)] 96.8 °F (36 °C)  Pulse:  [81-94] 84  Resp:  [14-20] 18  SpO2:  [94 %-99 %] 98 %  BP: (113-161)/(56-94) 131/80     Weight: 68.9 kg (151 lb 14.4 oz)  Body mass index is 25.28 kg/m².    Intake/Output Summary (Last 24 hours) at 3/16/2022 1657  Last data filed at 3/15/2022 2114  Gross per 24 hour   Intake 1050 ml   Output --   Net 1050 ml      Physical Exam  Vitals and nursing note reviewed.   Constitutional:       General: She is not in acute distress.     Appearance: She is not ill-appearing or diaphoretic.   HENT:      Head: Normocephalic and atraumatic.      Mouth/Throat:      Mouth: Mucous membranes are dry.   Eyes:      General: No  scleral icterus.        Right eye: No discharge.         Left eye: No discharge.      Conjunctiva/sclera: Conjunctivae normal.   Cardiovascular:      Rate and Rhythm: Normal rate and regular rhythm.      Pulses: Normal pulses.      Heart sounds: No murmur heard.  Pulmonary:      Effort: Pulmonary effort is normal. No respiratory distress.      Breath sounds: No wheezing or rhonchi.      Comments: BBS diminished  Abdominal:      General: Bowel sounds are normal. There is no distension.      Palpations: Abdomen is soft.      Tenderness: There is no abdominal tenderness. There is no guarding.   Genitourinary:     Comments: Not examined  Musculoskeletal:      Cervical back: Normal range of motion and neck supple. No rigidity or tenderness.      Right lower leg: Edema present.      Left lower leg: Edema present.      Comments: Stiff joints  General debility  Scant BLE edema          Skin:     General: Skin is warm and dry.      Capillary Refill: Capillary refill takes 2 to 3 seconds.      Comments: Sacral dressing   Neurological:      Mental Status: She is alert. She is disoriented.      Motor: Weakness present.      Comments: Oriented to person only  Confused   Psychiatric:         Attention and Perception: Attention normal.         Speech: Speech normal.      Comments: Calm and cooperative at this time          Lab Results   Component Value Date    WBC 9.30 03/16/2022    HGB 10.7 (L) 03/16/2022    HCT 34.1 (L) 03/16/2022    MCV 86 03/16/2022     03/16/2022       BMP  Lab Results   Component Value Date     03/16/2022    K 3.6 03/16/2022     (H) 03/16/2022    CO2 22 (L) 03/16/2022    BUN 19 03/16/2022    CREATININE 0.6 03/16/2022    CALCIUM 8.5 (L) 03/16/2022    ANIONGAP 8 03/16/2022    ESTGFRAFRICA >60 03/16/2022    EGFRNONAA >60 03/16/2022

## 2022-03-16 NOTE — ASSESSMENT & PLAN NOTE
-AMS likely secondary to underlying sacral decubitus ulcer/sepsis.  In setting of dementia.  -CT head negative for acute intracranial pathology.  -neurochecks every 4 hours.

## 2022-03-17 LAB
CREAT SERPL-MCNC: 0.7 MG/DL (ref 0.5–1.4)
EST. GFR  (AFRICAN AMERICAN): >60 ML/MIN/1.73 M^2
EST. GFR  (NON AFRICAN AMERICAN): >60 ML/MIN/1.73 M^2
VANCOMYCIN TROUGH SERPL-MCNC: 16.9 UG/ML (ref 10–22)

## 2022-03-17 PROCEDURE — 63600175 PHARM REV CODE 636 W HCPCS: Performed by: FAMILY MEDICINE

## 2022-03-17 PROCEDURE — S5010 5% DEXTROSE AND 0.45% SALINE: HCPCS | Performed by: NURSE PRACTITIONER

## 2022-03-17 PROCEDURE — 80202 ASSAY OF VANCOMYCIN: CPT | Performed by: FAMILY MEDICINE

## 2022-03-17 PROCEDURE — G0378 HOSPITAL OBSERVATION PER HR: HCPCS

## 2022-03-17 PROCEDURE — 96376 TX/PRO/DX INJ SAME DRUG ADON: CPT

## 2022-03-17 PROCEDURE — 25000003 PHARM REV CODE 250: Performed by: FAMILY MEDICINE

## 2022-03-17 PROCEDURE — 96366 THER/PROPH/DIAG IV INF ADDON: CPT

## 2022-03-17 PROCEDURE — 96361 HYDRATE IV INFUSION ADD-ON: CPT

## 2022-03-17 PROCEDURE — 25000003 PHARM REV CODE 250: Performed by: NURSE PRACTITIONER

## 2022-03-17 PROCEDURE — 82565 ASSAY OF CREATININE: CPT | Performed by: EMERGENCY MEDICINE

## 2022-03-17 PROCEDURE — 11044 DBRDMT BONE 1ST 20 SQ CM/<: CPT

## 2022-03-17 RX ADMIN — CEFEPIME HYDROCHLORIDE 2 G: 2 INJECTION, SOLUTION INTRAVENOUS at 08:03

## 2022-03-17 RX ADMIN — Medication 400 MG: at 09:03

## 2022-03-17 RX ADMIN — CEFEPIME HYDROCHLORIDE 2 G: 2 INJECTION, SOLUTION INTRAVENOUS at 11:03

## 2022-03-17 RX ADMIN — ZINC SULFATE 220 MG (50 MG) CAPSULE 220 MG: CAPSULE at 08:03

## 2022-03-17 RX ADMIN — CEFEPIME HYDROCHLORIDE 2 G: 2 INJECTION, SOLUTION INTRAVENOUS at 04:03

## 2022-03-17 RX ADMIN — OXYCODONE HYDROCHLORIDE AND ACETAMINOPHEN 500 MG: 500 TABLET ORAL at 08:03

## 2022-03-17 RX ADMIN — OXYCODONE HYDROCHLORIDE AND ACETAMINOPHEN 500 MG: 500 TABLET ORAL at 09:03

## 2022-03-17 RX ADMIN — DEXTROSE AND SODIUM CHLORIDE: 5; .45 INJECTION, SOLUTION INTRAVENOUS at 08:03

## 2022-03-17 NOTE — ASSESSMENT & PLAN NOTE
3/15 -AMS likely secondary to underlying sacral decubitus ulcer/sepsis.  In setting of dementia.  -CT head negative for acute intracranial pathology.  -neurochecks every 4 hours.    3/17  Pt has severe dementia   Patient awake and back at baseline, after starting IV antibiotics, according to patient's son at bedside.

## 2022-03-17 NOTE — PROGRESS NOTES
[facility]  Progress Note    Patient Name: Michela Arriaza  MRN: [id number]  Patient Class: OP- Observation   Admission Date: 3/15/2022  Length of Stay: 0 days  Attending Physician: Rita Wong MD  Primary Care Provider: Anatoliy Mayberry MD        Subjective:     Principal Problem:Severe sepsis        HPI:  Ms. Arriaza is a 72-year-old  female with a history of dementia, HTN, currently resident of nursing home, known history of sacral decubitus ulcer, was evaluated by a physician at the nursing home earlier today.  She was advised to go to the emergency department for further evaluation of worsening sacral decubitus wound.  Most of the information obtained from patient's son at the bedside.  Patient is currently on oral antibiotic (unknown medication) for UTI.  In the ED she is afebrile.  HR 84-94.  /80.  WBC 39483. 0% bands.  Lactic acid mildly elevated 2.5.  Procalcitonin 0.39.  UA is unremarkable (has been on oral antibiotics recently).  Patient is currently confused, and per family members at the bedside, though she has dementia, her current mental status is not normal.  Usually she is talkative and able to maintain some conversation.  Currently disoriented, unable to follow commands. CT head is negative for acute intracranial pathology.  Started on IV vancomycin, IV cefepime.    Admitting diagnosis:  Acute encephalopathy.  Sepsis secondary to sacral decubitus ulcer.  Recent UTI.  Advanced dementia.      Overview/Hospital Course:  The patient was monitored closely during her stay. She was kept on continuous telemetry monitoring. She was given IVF for hydration and started on IV Abx for Infected sacral wound. Wound care was consulted for evaluation and recommendations of her sacral wound.   3/16 Consult General surgery for I&D of sacral wound  As of 3/17/2022, vital signs stable. Continuing IV antibiotics for sacral decubitus. General Surgery consulted, reviewed case  with Dr Luque, who thinks palliative care conversation with family is needed. Patient is not a surgical candidate. Will continue wound care, as well.       Interval History:  Case reviewed with Dr Luque, who thinks palliative care conversation with family is needed. Patient is not a surgical candidate.    Review of Systems   Unable to perform ROS: Dementia   Objective:     Vital Signs (Most Recent):  Temp: 97.4 °F (36.3 °C) (03/17/22 1556)  Pulse: 94 (03/17/22 1556)  Resp: 17 (03/17/22 1556)  BP: 126/60 (03/17/22 1556)  SpO2: (!) 94 % (03/17/22 1556)   Vital Signs (24h Range):  Temp:  [97.4 °F (36.3 °C)-98.5 °F (36.9 °C)] 97.4 °F (36.3 °C)  Pulse:  [86-95] 94  Resp:  [17-18] 17  SpO2:  [91 %-98 %] 94 %  BP: (126-170)/(60-86) 126/60     Weight: 83.4 kg (183 lb 13.8 oz)  Body mass index is 30.6 kg/m².  No intake or output data in the 24 hours ending 03/17/22 1618   Physical Exam  Vitals and nursing note reviewed.   Constitutional:       General: She is not in acute distress.     Appearance: She is not ill-appearing or diaphoretic.   HENT:      Head: Normocephalic and atraumatic.      Mouth/Throat:      Mouth: Mucous membranes are dry.   Eyes:      General: No scleral icterus.        Right eye: No discharge.         Left eye: No discharge.      Conjunctiva/sclera: Conjunctivae normal.   Cardiovascular:      Rate and Rhythm: Normal rate and regular rhythm.      Pulses: Normal pulses.      Heart sounds: No murmur heard.  Pulmonary:      Effort: Pulmonary effort is normal. No respiratory distress.      Breath sounds: No wheezing or rhonchi.      Comments: BBS diminished  Abdominal:      General: Bowel sounds are normal. There is no distension.      Palpations: Abdomen is soft.      Tenderness: There is no abdominal tenderness. There is no guarding.   Genitourinary:     Comments: Not examined  Musculoskeletal:      Cervical back: Normal range of motion and neck supple. No rigidity or tenderness.      Right lower leg:  Edema present.      Left lower leg: Edema present.      Comments: Debility       Skin:     General: Skin is warm and dry.      Capillary Refill: Capillary refill takes 2 to 3 seconds.      Comments: Sacral dressing   Neurological:      Mental Status: She is alert. She is disoriented.      Motor: Weakness present.   Psychiatric:         Attention and Perception: Attention normal.         Speech: Speech normal.           Significant Labs: All pertinent labs within the past 24 hours have been reviewed.  CBC:   Recent Labs   Lab 03/15/22  1916 03/16/22  0450   WBC 11.91 9.30   HGB 11.3* 10.7*   HCT 36.3* 34.1*    220     CMP:   Recent Labs   Lab 03/15/22  1916 03/16/22  0450    141   K 4.0 3.6    111*   CO2 27 22*   * 108   BUN 22 19   CREATININE 0.8 0.6   CALCIUM 8.9 8.5*   PROT 6.7 6.0   ALBUMIN 2.5* 2.0*   BILITOT 0.3 0.5   ALKPHOS 106 96   AST 29 24   ALT 19 14   ANIONGAP 9 8   EGFRNONAA >60 >60       Significant Imaging:     Imaging Results              CT Head Without Contrast (Final result)  Result time 03/15/22 19:12:06      Final result by Albert Fulton MD (03/15/22 19:12:06)                   Impression:      No acute abnormality.    Atrophy and chronic white matter changes    Mild motion artifacts.    All CT scans   are performed using dose optimization techniques including the following: automated exposure control; adjustment of the mA and/or kV; use of iterative reconstruction technique.  Dose modulation was employed for ALARA by means of: Automated exposure control; adjustment of the mA and/or kV according to patient size (this includes techniques or standardized protocols for targeted exams where dose is matched to indication/reason for exam; i.e. extremities or head); and/or use of iterative reconstructive technique.      Electronically signed by: Donaldo Price  Date:    03/15/2022  Time:    19:12               Narrative:    EXAMINATION:  CT HEAD WITHOUT CONTRAST    CLINICAL  "HISTORY:  Mental status change, unknown cause;    TECHNIQUE:  Low dose axial CT images obtained throughout the head without intravenous contrast. Sagittal and coronal reconstructions were performed.    COMPARISON:  None.    FINDINGS:  Mild motion artifacts    No parenchymal mass, hemorrhage, edema or major vascular distribution infarct.    Atrophy and chronic white matter changes                                       X-Ray Chest AP Portable (Final result)  Result time 03/15/22 18:55:20      Final result by Albert Fulton MD (03/15/22 18:55:20)                   Impression:      No acute abnormality.  Cardiomegaly.  Mild subsegmental atelectasis.      Electronically signed by: Donaldo Price  Date:    03/15/2022  Time:    18:55               Narrative:    EXAMINATION:  XR CHEST AP PORTABLE    CLINICAL HISTORY:  Sepsis;    TECHNIQUE:  Single frontal view of the chest was performed.    COMPARISON:  None    FINDINGS:  Mild subsegmental atelectasis.The lungs are clear, with normal appearance of pulmonary vasculature and no pleural effusion or pneumothorax.    The cardiac silhouette is mildly enlarged.  The hilar and mediastinal contours are unremarkable.    Bones are intact.                                         Assessment/Plan:      * Severe sepsis secondary to Infected sacral decubitus  This patient does have evidence of infective focus  My overall impression is severe  sepsis. Vital signs were reviewed and noted in progress note.  Antibiotics given-   Antibiotics (From admission, onward)            Start     Stop Route Frequency Ordered    03/16/22 2200  vancomycin 2 g in dextrose 5 % 500 mL IVPB         -- IV Every 24 hours (non-standard times) 03/15/22 2315    03/16/22 0345  cefepime in dextrose 5 % IVPB 2 g         -- IV Every 8 hours (non-standard times) 03/15/22 2145    03/15/22 2156  vancomycin - pharmacy to dose  (vancomycin IVPB)        "And" Linked Group Details    -- IV pharmacy to manage frequency 03/15/22 " 2058        Cultures were taken-   Microbiology Results (last 7 days)     Procedure Component Value Units Date/Time    Blood culture x two cultures. Draw prior to antibiotics. [[id number]] Collected: 03/15/22 1917    Order Status: Completed Specimen: Blood from Peripheral, Antecubital, Right Updated: 03/17/22 0613     Blood Culture, Routine No Growth to date      No Growth to date    Narrative:      Aerobic and anaerobic    Blood culture x two cultures. Draw prior to antibiotics. [[id number]] Collected: 03/15/22 1931    Order Status: Completed Specimen: Blood from Peripheral, Antecubital, Left Updated: 03/17/22 0613     Blood Culture, Routine No Growth to date      No Growth to date    Narrative:      Aerobic and anaerobic        Latest lactate reviewed, they are-  Recent Labs   Lab 03/15/22  1916 03/15/22  2233 03/16/22  1125   LACTATE 2.5* 2.3* 2.0       Organ dysfunction indicated by Encephalopathy   Source- Sacral Wound    Source control Achieved by- IV abx    3/15 -afebrile.  HR 84-94.  WBC 65073. Lactic acid 2.5.  -source of infection likely sacral decubitus ulcer/wound.  -hemodynamically stable.  -continue IV vancomycin, IV cefepime empirically.  -follow up on cultures.    3/16 Continue current treatment  Consult General surgery for I&D    3/17- Case with Dr Luque, who thinks palliative care conversation with family is needed.   Patient is not a surgical candidate.  Continue wound care and IV antibiotics         Hypoalbuminemia  3/16 Add po supplement      Normocytic anemia  3/16 Monitor      Acute metabolic encephalopathy  3/15 -AMS likely secondary to underlying sacral decubitus ulcer/sepsis.  In setting of dementia.  -CT head negative for acute intracranial pathology.  -neurochecks every 4 hours.    3/17  Pt has severe dementia   Patient awake and back at baseline, after starting IV antibiotics, according to patient's son at bedside.       Decubitus ulcer of sacral region  3/15 -Per report, stage IV sacral  decubitus ulcer.  -IV vancomycin, IV cefepime.  -wound care consult.  3/16 Add Zinc and Vit C    3/17  BC X 2 NGTD  Continue IV antibiotic   Wound care   General surgery following           Dementia without behavioral disturbance with Debility  3/16 Fall and skin precautions  Turn Q 2 hours              VTE Risk Mitigation (From admission, onward)         Ordered     Place sequential compression device  Until discontinued         03/15/22 2058                Discharge Planning   BERNA:      Code Status: Prior   Is the patient medically ready for discharge?:     Reason for patient still in hospital (select all that apply): Patient trending condition and Treatment  Discharge Plan A: Return to nursing home                  Kei Staton NP  Department of Hospital Medicine   [facility] - [facility] S[facility]

## 2022-03-17 NOTE — SUBJECTIVE & OBJECTIVE
Interval History:  Case reviewed with Dr Luque, who thinks palliative care conversation with family is needed. Patient is not a surgical candidate.    Review of Systems   Unable to perform ROS: Dementia   Objective:     Vital Signs (Most Recent):  Temp: 97.4 °F (36.3 °C) (03/17/22 1556)  Pulse: 94 (03/17/22 1556)  Resp: 17 (03/17/22 1556)  BP: 126/60 (03/17/22 1556)  SpO2: (!) 94 % (03/17/22 1556)   Vital Signs (24h Range):  Temp:  [97.4 °F (36.3 °C)-98.5 °F (36.9 °C)] 97.4 °F (36.3 °C)  Pulse:  [86-95] 94  Resp:  [17-18] 17  SpO2:  [91 %-98 %] 94 %  BP: (126-170)/(60-86) 126/60     Weight: 83.4 kg (183 lb 13.8 oz)  Body mass index is 30.6 kg/m².  No intake or output data in the 24 hours ending 03/17/22 1618   Physical Exam  Vitals and nursing note reviewed.   Constitutional:       General: She is not in acute distress.     Appearance: She is not ill-appearing or diaphoretic.   HENT:      Head: Normocephalic and atraumatic.      Mouth/Throat:      Mouth: Mucous membranes are dry.   Eyes:      General: No scleral icterus.        Right eye: No discharge.         Left eye: No discharge.      Conjunctiva/sclera: Conjunctivae normal.   Cardiovascular:      Rate and Rhythm: Normal rate and regular rhythm.      Pulses: Normal pulses.      Heart sounds: No murmur heard.  Pulmonary:      Effort: Pulmonary effort is normal. No respiratory distress.      Breath sounds: No wheezing or rhonchi.      Comments: BBS diminished  Abdominal:      General: Bowel sounds are normal. There is no distension.      Palpations: Abdomen is soft.      Tenderness: There is no abdominal tenderness. There is no guarding.   Genitourinary:     Comments: Not examined  Musculoskeletal:      Cervical back: Normal range of motion and neck supple. No rigidity or tenderness.      Right lower leg: Edema present.      Left lower leg: Edema present.      Comments: Debility       Skin:     General: Skin is warm and dry.      Capillary Refill: Capillary  refill takes 2 to 3 seconds.      Comments: Sacral dressing   Neurological:      Mental Status: She is alert. She is disoriented.      Motor: Weakness present.   Psychiatric:         Attention and Perception: Attention normal.         Speech: Speech normal.           Significant Labs: All pertinent labs within the past 24 hours have been reviewed.  CBC:   Recent Labs   Lab 03/15/22  1916 03/16/22  0450   WBC 11.91 9.30   HGB 11.3* 10.7*   HCT 36.3* 34.1*    220     CMP:   Recent Labs   Lab 03/15/22  1916 03/16/22  0450    141   K 4.0 3.6    111*   CO2 27 22*   * 108   BUN 22 19   CREATININE 0.8 0.6   CALCIUM 8.9 8.5*   PROT 6.7 6.0   ALBUMIN 2.5* 2.0*   BILITOT 0.3 0.5   ALKPHOS 106 96   AST 29 24   ALT 19 14   ANIONGAP 9 8   EGFRNONAA >60 >60       Significant Imaging:     Imaging Results              CT Head Without Contrast (Final result)  Result time 03/15/22 19:12:06      Final result by Albert Fulton MD (03/15/22 19:12:06)                   Impression:      No acute abnormality.    Atrophy and chronic white matter changes    Mild motion artifacts.    All CT scans   are performed using dose optimization techniques including the following: automated exposure control; adjustment of the mA and/or kV; use of iterative reconstruction technique.  Dose modulation was employed for ALARA by means of: Automated exposure control; adjustment of the mA and/or kV according to patient size (this includes techniques or standardized protocols for targeted exams where dose is matched to indication/reason for exam; i.e. extremities or head); and/or use of iterative reconstructive technique.      Electronically signed by: Donaldo Price  Date:    03/15/2022  Time:    19:12               Narrative:    EXAMINATION:  CT HEAD WITHOUT CONTRAST    CLINICAL HISTORY:  Mental status change, unknown cause;    TECHNIQUE:  Low dose axial CT images obtained throughout the head without intravenous contrast. Sagittal  and coronal reconstructions were performed.    COMPARISON:  None.    FINDINGS:  Mild motion artifacts    No parenchymal mass, hemorrhage, edema or major vascular distribution infarct.    Atrophy and chronic white matter changes                                       X-Ray Chest AP Portable (Final result)  Result time 03/15/22 18:55:20      Final result by Albert Fulton MD (03/15/22 18:55:20)                   Impression:      No acute abnormality.  Cardiomegaly.  Mild subsegmental atelectasis.      Electronically signed by: Donaldo Price  Date:    03/15/2022  Time:    18:55               Narrative:    EXAMINATION:  XR CHEST AP PORTABLE    CLINICAL HISTORY:  Sepsis;    TECHNIQUE:  Single frontal view of the chest was performed.    COMPARISON:  None    FINDINGS:  Mild subsegmental atelectasis.The lungs are clear, with normal appearance of pulmonary vasculature and no pleural effusion or pneumothorax.    The cardiac silhouette is mildly enlarged.  The hilar and mediastinal contours are unremarkable.    Bones are intact.

## 2022-03-17 NOTE — NURSING
Received in bed in semi-moreira's position, awake and alert, family at bedside, assessment per flowsheet, lol mattress in place. Bed low, call light within reach.

## 2022-03-17 NOTE — CONSULTS
[facility] Surg  Wound Care    Patient Name:  Michela Arriaza   MRN:  [id number]  Date: 3/16/2022  Diagnosis: Severe sepsis    History:     Past Medical History:   Diagnosis Date    Hyperlipidemia     Hypertension        Social History     Socioeconomic History    Marital status:    Tobacco Use    Smoking status: Former Smoker    Smokeless tobacco: Never Used   Substance and Sexual Activity    Alcohol use: No     Alcohol/week: 0.0 standard drinks    Drug use: No    Sexual activity: Yes     Partners: Male       Precautions:     Allergies as of 03/15/2022 - Reviewed 03/15/2022   Allergen Reaction Noted    Iodine and iodide containing products  03/24/2016    Levofloxacin Other (See Comments) 06/22/2021       [facility] Assessment Details/Treatment             Consulted to this 72 year old female for present on admission wound. History of dementia, HTN, currently resident of nursing home, known history of sacral decubitus ulcer, was evaluated by a physician at the nursing home earlier today.  She was advised to go to the emergency department for further evaluation of worsening sacral decubitus wound.  she is awake and alert, confused.  at the bedside. Turned to right side with max assist. [id number] stage 4 pressure injury to her sacrum. Wound is mostly covered with grey, moist slough. Malodorous. Undermines up to 4cm from 11-1 oclock. Palpable bone noted. periwound denuded down to lower buttock/ischiums. Filled wound with saline moistened gauze. Padded with dry gauze and secured with tape. Denuded areas coated with moisture barrier. Bilateral heels pink, sluggishly blanchable. Heel boots to be applied.  ALLIE pump in place. Recommend general surgery consult. Will follow.    03/16/2022

## 2022-03-17 NOTE — ASSESSMENT & PLAN NOTE
3/15 -Per report, stage IV sacral decubitus ulcer.  -IV vancomycin, IV cefepime.  -wound care consult.  3/16 Add Zinc and Vit C    3/17  BC X 2 NGTD  Continue IV antibiotic   Wound care   General surgery following

## 2022-03-17 NOTE — PLAN OF CARE
Pt remains free of falls/injury this shift. Safety precautions maintained. Pt. denies any pain or discomfort this shift. Tele monitor in place. IVFs infusing. VSS. No signs and symptoms of acute distress noted at this time. 12 hour chart check completed. Will continue to monitor.

## 2022-03-17 NOTE — ASSESSMENT & PLAN NOTE
"This patient does have evidence of infective focus  My overall impression is severe  sepsis. Vital signs were reviewed and noted in progress note.  Antibiotics given-   Antibiotics (From admission, onward)            Start     Stop Route Frequency Ordered    03/16/22 2200  vancomycin 2 g in dextrose 5 % 500 mL IVPB         -- IV Every 24 hours (non-standard times) 03/15/22 2315    03/16/22 0345  cefepime in dextrose 5 % IVPB 2 g         -- IV Every 8 hours (non-standard times) 03/15/22 2145    03/15/22 2156  vancomycin - pharmacy to dose  (vancomycin IVPB)        "And" Linked Group Details    -- IV pharmacy to manage frequency 03/15/22 2058        Cultures were taken-   Microbiology Results (last 7 days)     Procedure Component Value Units Date/Time    Blood culture x two cultures. Draw prior to antibiotics. [[id number]] Collected: 03/15/22 1917    Order Status: Completed Specimen: Blood from Peripheral, Antecubital, Right Updated: 03/17/22 0613     Blood Culture, Routine No Growth to date      No Growth to date    Narrative:      Aerobic and anaerobic    Blood culture x two cultures. Draw prior to antibiotics. [[id number]] Collected: 03/15/22 1931    Order Status: Completed Specimen: Blood from Peripheral, Antecubital, Left Updated: 03/17/22 0613     Blood Culture, Routine No Growth to date      No Growth to date    Narrative:      Aerobic and anaerobic        Latest lactate reviewed, they are-  Recent Labs   Lab 03/15/22  1916 03/15/22  2233 03/16/22  1125   LACTATE 2.5* 2.3* 2.0       Organ dysfunction indicated by Encephalopathy   Source- Sacral Wound    Source control Achieved by- IV abx    3/15 -afebrile.  HR 84-94.  WBC 26750. Lactic acid 2.5.  -source of infection likely sacral decubitus ulcer/wound.  -hemodynamically stable.  -continue IV vancomycin, IV cefepime empirically.  -follow up on cultures.    3/16 Continue current treatment  Consult General surgery for I&D    3/17- Case with Dr Luque, who thinks " palliative care conversation with family is needed.   Patient is not a surgical candidate.  Continue wound care and IV antibiotics

## 2022-03-18 PROCEDURE — 96366 THER/PROPH/DIAG IV INF ADDON: CPT

## 2022-03-18 PROCEDURE — 99205 PR OFFICE/OUTPT VISIT, NEW, LEVL V, 60-74 MIN: ICD-10-PCS | Mod: ,,, | Performed by: NURSE PRACTITIONER

## 2022-03-18 PROCEDURE — 25000003 PHARM REV CODE 250: Performed by: NURSE PRACTITIONER

## 2022-03-18 PROCEDURE — 21400001 HC TELEMETRY ROOM

## 2022-03-18 PROCEDURE — 99205 OFFICE O/P NEW HI 60 MIN: CPT | Mod: ,,, | Performed by: NURSE PRACTITIONER

## 2022-03-18 PROCEDURE — S5010 5% DEXTROSE AND 0.45% SALINE: HCPCS | Performed by: NURSE PRACTITIONER

## 2022-03-18 PROCEDURE — 99497 PR ADVNCD CARE PLAN 30 MIN: ICD-10-PCS | Mod: ,,, | Performed by: NURSE PRACTITIONER

## 2022-03-18 PROCEDURE — 25000003 PHARM REV CODE 250: Performed by: EMERGENCY MEDICINE

## 2022-03-18 PROCEDURE — 99497 ADVNCD CARE PLAN 30 MIN: CPT | Mod: ,,, | Performed by: NURSE PRACTITIONER

## 2022-03-18 PROCEDURE — 63600175 PHARM REV CODE 636 W HCPCS: Performed by: FAMILY MEDICINE

## 2022-03-18 PROCEDURE — 96361 HYDRATE IV INFUSION ADD-ON: CPT

## 2022-03-18 PROCEDURE — 11000001 HC ACUTE MED/SURG PRIVATE ROOM

## 2022-03-18 RX ORDER — POLYETHYLENE GLYCOL 3350 17 G/17G
17 POWDER, FOR SOLUTION ORAL 2 TIMES DAILY PRN
Status: DISCONTINUED | OUTPATIENT
Start: 2022-03-18 | End: 2022-03-20 | Stop reason: HOSPADM

## 2022-03-18 RX ADMIN — OXYCODONE HYDROCHLORIDE AND ACETAMINOPHEN 500 MG: 500 TABLET ORAL at 08:03

## 2022-03-18 RX ADMIN — CEFEPIME HYDROCHLORIDE 2 G: 2 INJECTION, SOLUTION INTRAVENOUS at 06:03

## 2022-03-18 RX ADMIN — POLYETHYLENE GLYCOL 3350 17 G: 17 POWDER, FOR SOLUTION ORAL at 12:03

## 2022-03-18 RX ADMIN — DAKIN'S SOLUTION 0.125% (QUARTER STRENGTH): 0.12 SOLUTION at 08:03

## 2022-03-18 RX ADMIN — ZINC SULFATE 220 MG (50 MG) CAPSULE 220 MG: CAPSULE at 08:03

## 2022-03-18 RX ADMIN — DAKIN'S SOLUTION 0.125% (QUARTER STRENGTH): 0.12 SOLUTION at 10:03

## 2022-03-18 RX ADMIN — POLYETHYLENE GLYCOL 3350 17 G: 17 POWDER, FOR SOLUTION ORAL at 08:03

## 2022-03-18 RX ADMIN — CEFEPIME HYDROCHLORIDE 2 G: 2 INJECTION, SOLUTION INTRAVENOUS at 11:03

## 2022-03-18 RX ADMIN — Medication 400 MG: at 10:03

## 2022-03-18 RX ADMIN — OXYCODONE HYDROCHLORIDE AND ACETAMINOPHEN 500 MG: 500 TABLET ORAL at 10:03

## 2022-03-18 RX ADMIN — DEXTROSE AND SODIUM CHLORIDE: 5; .45 INJECTION, SOLUTION INTRAVENOUS at 03:03

## 2022-03-18 NOTE — PROGRESS NOTES
"Advance Care Planning   [facility] S[facility]  Palliative Care   Psychosocial Assessment    Patient Name: Michela Arriaza  MRN: [id number]  Admission Date: 3/15/2022  Hospital Length of Stay: 0 days  Code Status: DNR   Attending Provider: Bon Hollis MD  Palliative Care Provider: Dottie Dubon NP  Primary Care Physician: Anatoliy Mayberry MD  Principal Problem:Severe sepsis    Reason for Referral: assistance with advance directives and psychosocial support  Consult Order Date: 3/18/22  Primary CM/SW: Magnolia Watson LMSW    Present during Interview: patient and spouse/SO.      Primary Language:English   Needed: no      Past Medical Situation:   PMH:   Past Medical History:   Diagnosis Date    Hyperlipidemia     Hypertension      Mental Health/Substance Use History:  Risk of Abuse, neglect or exploitation:   Current or Previous Trauma and/or evidence of PTSD:  Non-traditional Health practices:     Understanding of diagnosis and prognosis: good, first GOC had with family today by PM NP which spouse reports was "a lot to take in", but very appreciative of discussion today  Experience/Comfort level with health care system: good - spouse and son have been caring for pt over past 6 months even while in the nursing home (there from 9am - 7pm)    Patients Mental Status: disoriented/confused    Socio-Economic Factors/Resources:  Address: [address] [place] [address]  Phone Number: [phone] (home) [phone] (work)    Marital Status:   Household composition: lived with spouse prior to placement in nursing home  Children:  2 sons (one )    Patient/Family perceptions about Caregiving Needs; availability and capacity: Spouse was caring for patient prior to her SNF placement which resulted in her becoming a resident of NH. He still goes to NH from 9am - 7pm daily to care for her. Spouse reports he would love to take her home, because he knows she would rather be there " and pass away there. We talked about how this would look with support of hospice and possibly sitters to help him, if needed. Spouse is very interested with getting pt home with support of hospice and plans to talk with [facility] about this tomorrow.     Family Structure, Dynamics/Relationships: Patient and spouse have 2 sons. One of their sons passed away, but their other son, Hank, is very involved.     Patterns/Styles of Communication and Decision-making in the Family: Spouse is the decision maker due to pt's dementia.    Patient/Family Strengths/Resilience: family support, understanding of pt's diagnosis/prognosis  Patient/Family Coping Style: appropriate to situation    Activities of Daily Living: dependent with ADLs  Support Systems-Family & Community (Home Health, HME etc): current NH resident    Transportation:  yes    Work/Education History: retired  Self-Care Activities/Hobbies: Spouse reports pt enjoyed sitting on their back porch feeding the squirrels every evening, and he would love for her to come home and be able to sit on the back porch with him again.     History: no    Financial Resources:Medicare      Advanced Care Planning & Legal Concerns:   Advanced Directives/Living Will: yes - copies not received.   LaPOST/POLST: deferred - spouse was not ready to complete today   Planning:  no    Medical Power of : no     Oral/Written Declaration: no  Witnesses:   Surrogate Decision Maker: Spouse, Abrahan [phone]    Emergency Contacts: Spouse, Abrahan [phone]    Spirituality, Culture & Coping Mechanisms:  F- A[place] and Belief: Mormonism     I - Importance: yes    C - Community/Culture Values:     A - Address in Care:       Goals/Hopes/Expectations: return home with hospice  Fears/Anxiety/Concerns:         Preferences about EOL Environment: (own bed, family nearby, pets, music, etc)  Own home, surrounded my loved ones    Complicated Bereavement Risk Assessment Tool  (CBRAT)  Reference:  [facility] Clinical Practice Group (May 2016). Bereavement Risk Screening and Management Guidelines.  Retrieved from: http://www.grpcc.com.au/wp-content/uploads//TFJHP-Coartcqukxc-Nvdcwoxqc-and-Management-Guideline-2016.pdf      Bereaved Client Characteristics   ? Under 18      no  ? Was a Twin   no  ? Young Spouse   no  ? Elderly Spouse    yes  ? Isolated    no  ? Lacks Meaningful Social Support   no  ? Dissatisfied with help available during illness   no  ? New to [employer] [place] no  ? New to Decision-Making   no    Illness  ? Inherited Disorder   no  ? Stigmatized Disease in the family/community   no  ? Lengthy/Burdensome   no     Bereaved Client's History of Loss   ? Cumulative Multiple Losses   no  ? Previous Mental Health Illnesses   no  ? Current Mental Health Illness   no  ? Other Significant Health Issues   no   ? Migrant/Refugee   no Will the Death be:  ? Sudden or Unexpected   no  ? Traumatic Circumstances Associated with Death   no  ? Significant Cultural/Social Burdens as a result of Death   no   Relationship with   ? Profound Lifelong Partner   yes  ? Highly Dependent    no  ? Antagonistic   no  ? Ambivalent   no  ? Deeply Connected   yes  ? Culturally Defined   no   Risk Factors Scores  0-2  Low  3-5  Moderate  5+  High  All persons scoring moderate to high presume to be at risk**    (** It is acknowledged that protective factors and resilience may outweigh apparent risk factors.      Total Risk Factors Score:   Low  Met with pt's spouse as son, Hank, was leaving to get some rest at home. Spouse reports he has been visiting pt daily at the NH and would love for him to come back home. We spoke about getting home with support of hospice, and he and Hank plan to meet with rep from [facility]  tomorrow to talk more about this. Spouse was not ready to complete LaPOST today, but reports he would no want to prolong  pt's suffering.         Discharge Planning Needs/Plan of Care:   Spouse and son to meet with [facility] tomorrow and likely d/c back to NH or home with hospice.      Shai Covington, MSW, [facility]  Palliative Medicine  [phone]

## 2022-03-18 NOTE — CONSULTS
Advance Care Planning    Consult Note  Palliative Medicine      Consult Requested By: ANA MARIA Staton NP with [facility]  Reason for Consult: Goals of care and Advance care planning  SUBJECTIVE:     History of Present Illness:  Michela Arriaza is a 72 y.o. year old female vascular dementia with behavior disturbance who resides long-term at [facility]. Her chronic medical conditions  include HTN, HLD constipation, closed fracture of the right inferior pubic ramus, falls, recurrent UTIs and chronic sacral ulcer.  She presented to hospital with worsening mentation on oral antibiotics at nursing home.  CT head negative.  Surgery consulted for I&D of sacral wound.  We have been consulted to clarify goals of care in a patient with progressive dementia and recurrent infections.      NARRATIVE     After assessing the patient and reviewing her medical records, I talked to the patient's spouse Abrahan and her son Hank to identify goals of care and to assess their understanding of the patient's overall condition and the events leading up to this hospitalization.  They are able to articulate most of medical information as it relates to the patient's past medical history, hospital course, current treatment plan and prognosis.    They spoke of her decline since her fall last year resulting in pubic ramus fracture.  She was sent to SNF but failed to make progress.  It also resulted in exacerbation of her dementia.  She was ultimately transitioned to residential care.  The family reports they were approached about hospice at that juncture but did not feel ready.  I spent a great deal of time educating them on the natural progression of dementia complicated by recurrent UTIs and decubitus ulcer.  I explained why she will be at risk for ongoing infection based on location of her wound.  She is primarily bedbound.  They nursing home gets her up from 9 until about lunch; otherwise, she is in the bed.      We talked  "extensively about the benefits of hospice, how the resource is provided, who is eligible, the difference between hospice at home versus hospice in a nursing home setting, and what hospice really looks like when it is utilized at both locations.    We also discussed resuscitation, what it means, when it may be helpful, and when it may not be helpful. We clarified that code status is a term used to signify to the medical team what the plan of care is at the time of cardiac or respiratory arrest. We defined the difference between a "full code" status and a "do not attempt resuscitation" status.  They agreed to a Do Not Resuscitate at the understand chest compressions, shocking of the heart and mechanical intubation will only serve to prolong the inevitable in someone with advance dementia.  DNR noted and will complete a LaPOST and upload into her EMR.     We discussed the advantages and disadvantages of artificial nutrition and hydration. Unfortunately, in advanced dementia, feeding tubes offer no improvement in quality of life or even length of life, but confer serious problems including increased use of restraints, diarrhea and impaired wound healing of sacral wounds in some cases.  She is not having issues with swallowing but because we are completing a LaPOST, I explained why this needed to be discussed now versus down the road.  She is fed a modified diet at present.    At this point, they are leaning towards hospice but did not commit.  They want to meet with the hospice team first and discuss in more detail as a family before making a decision.  They plan to meet with hospice tomorrow.     They understand once she has reached maximum hospital benefit/stable, the team will work towards getting her back to nursing home.    All questions and concerns were addressed in an effort to enhance understanding of patient's overall prognosis and/or condition. They were appreciative of the information and know how to reach " me for any additional questions or concerns.      ASSESSEMENT / PLAN     1.   Encounter for Palliative Care   -DNR; will complete a LaPOST   -Continue current plan of care   -Consult [facility] for information visit   -Family/Emotional Support   -Will follow up on Monday    2.     Sepsis r/t stage IV sacral decubitus   -Surgery following  s/p bedside debridement   -Continue IV antibiotics   -Add tramadol prn pain    3.     Recurrent UTIs   -likely benefit from daily prophylactic antibiotic    4.     Vascular Dementia with behavioral disturbance   -Supportive care   -FAST stage 7C   -PPS 30-40   -Incontinent of bowel/bladder   -Total assist, including feeding   -Intercurrent illnesses include UTIs, stage IV sacral decubitus ulcer   -Albumin 2.0    Thank you for allowing Palliative Medicine to be involved in the care of Michela Arriaza.    Past Medical History:   Diagnosis Date    Hyperlipidemia     Hypertension      History reviewed. No pertinent surgical history.  History reviewed. No pertinent family history.  Social History     Socioeconomic History    Marital status:    Tobacco Use    Smoking status: Former Smoker    Smokeless tobacco: Never Used   Substance and Sexual Activity    Alcohol use: No     Alcohol/week: 0.0 standard drinks    Drug use: No    Sexual activity: Yes     Partners: Male      Review of patient's allergies indicates:   Allergen Reactions    Iodine and iodide containing products     Levofloxacin Other (See Comments)       Medications:    Current Facility-Administered Medications:     acetaminophen tablet 650 mg, 650 mg, Oral, Q6H PRN, Thuan Pena MD    albuterol-ipratropium 2.5 mg-0.5 mg/3 mL nebulizer solution 3 mL, 3 mL, Nebulization, Q4H PRN, Thuan Pena MD    aluminum-magnesium hydroxide-simethicone 200-200-20 mg/5 mL suspension 30 mL, 30 mL, Oral, Q6H PRN, Thuan Pena MD    ascorbic acid (vitamin C) tablet 500 mg, 500 mg, Oral, BID, Alta HASSAN  MARÍA ELENA Shea, 500 mg at 03/17/22 2024    cefepime in dextrose 5 % IVPB 2 g, 2 g, Intravenous, Q8H, Rios Soriano MD, Stopped at 03/17/22 2104    dextrose 5 % and 0.45 % NaCl infusion, , Intravenous, Continuous, MARÍA ELENA Colunga, Stopped at 03/18/22 0420    guaiFENesin 100 mg/5 ml syrup 200 mg, 200 mg, Oral, Q4H PRN, Thuan Pena MD    magnesium oxide tablet 400 mg, 400 mg, Oral, Daily, MARÍA ELENA Colunga, 400 mg at 03/17/22 0950    ondansetron injection 4 mg, 4 mg, Intravenous, Q8H PRN, Thuan Pena MD    polyethylene glycol packet 17 g, 17 g, Oral, BID PRN, Lucina Carreno, NP, 17 g at 03/18/22 0030    potassium chloride SA CR tablet 20 mEq, 20 mEq, Oral, Once, MARÍA ELENA Colunga    sodium hypochlorite 0.125% external solution, , Topical (Top), BID, Rita Wong MD    Pharmacy to dose Vancomycin consult, , , Once **AND** vancomycin - pharmacy to dose, , Intravenous, pharmacy to manage frequency, Thuan Pena MD    vancomycin 2 g in dextrose 5 % 500 mL IVPB, 2,000 mg, Intravenous, Q24H, Rios Soriano MD, Last Rate: 250 mL/hr at 03/18/22 0415, Restarted at 03/18/22 0415    zinc sulfate capsule 220 mg, 220 mg, Oral, QHS, JESSEE ColungaP, 220 mg at 03/17/22 2024    ROS:  Review of Systems   Unable to perform ROS: Dementia       OBJECTIVE:     Physical Exam:  Vitals: Temp: 97.4 °F (36.3 °C) (03/18/22 0753)  Pulse: 102 (03/18/22 0753)  Resp: 18 (03/18/22 0753)  BP: 121/68 (03/18/22 0753)  SpO2: 98 % (03/18/22 0753)    Physical Exam  Vitals reviewed.   Constitutional:       General: She is not in acute distress.     Appearance: She is well-developed. She is ill-appearing.   Abdominal:      General: Abdomen is flat.      Palpations: Abdomen is soft.   Musculoskeletal:      Comments: Generalized weakness   Skin:     General: Skin is warm and dry.      Coloration: Skin is pale.      Findings: Bruising and wound present.   Neurological:      Mental Status: She is alert. She is  disoriented and confused.      Motor: Weakness present.      Gait: Gait abnormal.   Psychiatric:         Attention and Perception: She is inattentive.         Cognition and Memory: Cognition is impaired. Memory is impaired.         Review of Symptoms    Symptom Assessment (ESAS 0-10 Scale)  Unable to complete assessment due to Dementia         Living Arrangements:  Lives in nursing home    Psychosocial/Cultural: ; one son      Advance Directives:   Living Will: Yes        Copy on chart: No    Do Not Resuscitate Status: Yes      Decision Making:  Family answered questions and Patient unable to communicate due to disease severity/cognitive impairment      Labs:  WBC   Date Value Ref Range Status   03/16/2022 9.30 3.90 - 12.70 K/uL Final     Hemoglobin   Date Value Ref Range Status   03/16/2022 10.7 (L) 12.0 - 16.0 g/dL Final     Hematocrit   Date Value Ref Range Status   03/16/2022 34.1 (L) 37.0 - 48.5 % Final     MCV   Date Value Ref Range Status   03/16/2022 86 82 - 98 fL Final     Platelets   Date Value Ref Range Status   03/16/2022 220 150 - 450 K/uL Final       BMP  Lab Results   Component Value Date     03/16/2022    K 3.6 03/16/2022     (H) 03/16/2022    CO2 22 (L) 03/16/2022    BUN 19 03/16/2022    CREATININE 0.7 03/17/2022    CALCIUM 8.5 (L) 03/16/2022    ANIONGAP 8 03/16/2022    ESTGFRAFRICA >60 03/17/2022    EGFRNONAA >60 03/17/2022       Lab Results   Component Value Date    AST 24 03/16/2022    ALKPHOS 96 03/16/2022    BILITOT 0.5 03/16/2022       Albumin   Date Value Ref Range Status   03/16/2022 2.0 (L) 3.5 - 5.2 g/dL Final       Radiology:CT: I have reviewed all pertinent results/findings within the past 24 hours:  CT of head 03/15/2022   Impression:   No acute abnormality.   Atrophy and chronic white matter changes       Discussed case and visit details with [facility], case management, hospice team     Medical decision making: untreated symptoms poor prognosis    Plan required increased  "review of medication choice, interaction, dosing, frequency, and route due to patient complexity. Patient complexity increased by: altered mentation, at risk for delirium, poor historian and malnutrition    35 min ACP time spent discussing: code status, assessed patient specific goals and addressed the best way to achieve them, coordination of care and emotional support, formulating and communicating prognosis, exploring burden/ benefit of various approaches of treatment, inquired about existing or willingness to complete advance directive documents.        Dottie Dubon NP  Palliative Medicine    Portions of this note may have been created with voice recognition software. Occasional "wrong word" or "sound alike" substitutions may have occurred due to the inherent limitations of voice recognition software. Please, read the note carefully and recognize, using context, where substitutions have occurred.     "

## 2022-03-18 NOTE — CONSULTS
CM received a secure chat from Dottie VELIZ NP stating that family possibly wants to dc with hospice in the NH. Family would like to have an information session setup with [facility]  to make final decsion. [facility] Choice form received by Manasa MISTRY And placed in patients blue folder. Pending info session and signed consents.

## 2022-03-18 NOTE — PLAN OF CARE
Pt remains free of falls/injury this shift. Safety precautions maintained. Pt. denies any pain or discomfort this shift. Tele monitor in place. VSS.  No signs and symptoms of acute distress noted at this time. 12 hour chart check completed.

## 2022-03-18 NOTE — ASSESSMENT & PLAN NOTE
3/15 -AMS likely secondary to underlying sacral decubitus ulcer/sepsis.  In setting of dementia.  -CT head negative for acute intracranial pathology.  -neurochecks every 4 hours.    3/17  Pt has severe dementia   Patient awake and back at baseline, after starting IV antibiotics, according to patient's son at bedside.       Resolved

## 2022-03-18 NOTE — SUBJECTIVE & OBJECTIVE
Review of Systems   Unable to perform ROS: Dementia   Objective:     Vital Signs (Most Recent):  Temp: 98.2 °F (36.8 °C) (03/18/22 1132)  Pulse: 98 (03/18/22 1132)  Resp: 18 (03/18/22 1132)  BP: (!) 122/58 (03/18/22 1132)  SpO2: 98 % (03/18/22 1132)   Vital Signs (24h Range):  Temp:  [97.4 °F (36.3 °C)-99.6 °F (37.6 °C)] 98.2 °F (36.8 °C)  Pulse:  [] 98  Resp:  [17-18] 18  SpO2:  [94 %-98 %] 98 %  BP: (121-174)/(58-79) 122/58     Weight: 82.8 kg (182 lb 8.7 oz)  Body mass index is 30.38 kg/m².    Intake/Output Summary (Last 24 hours) at 3/18/2022 1500  Last data filed at 3/18/2022 1200  Gross per 24 hour   Intake 2441.73 ml   Output --   Net 2441.73 ml      Physical Exam  Vitals and nursing note reviewed.   Constitutional:       General: She is not in acute distress.     Appearance: She is not ill-appearing or diaphoretic.   HENT:      Head: Normocephalic and atraumatic.      Mouth/Throat:      Mouth: Mucous membranes are dry.   Eyes:      General: No scleral icterus.        Right eye: No discharge.         Left eye: No discharge.      Conjunctiva/sclera: Conjunctivae normal.   Cardiovascular:      Rate and Rhythm: Normal rate and regular rhythm.      Pulses: Normal pulses.      Heart sounds: No murmur heard.  Pulmonary:      Effort: Pulmonary effort is normal. No respiratory distress.      Breath sounds: No wheezing or rhonchi.      Comments: BBS diminished  Abdominal:      General: Bowel sounds are normal. There is no distension.      Palpations: Abdomen is soft.      Tenderness: There is no abdominal tenderness. There is no guarding.   Genitourinary:     Comments: Not examined  Musculoskeletal:      Cervical back: Normal range of motion and neck supple. No rigidity or tenderness.      Right lower leg: Edema present.      Left lower leg: Edema present.      Comments: Debility       Skin:     General: Skin is warm and dry.      Capillary Refill: Capillary refill takes 2 to 3 seconds.      Comments: Sacral  dressing   Neurological:      Mental Status: She is alert. She is disoriented.      Motor: Weakness present.   Psychiatric:         Attention and Perception: Attention normal.         Speech: Speech normal.       Significant Labs: All pertinent labs within the past 24 hours have been reviewed.  Blood Culture: No results for input(s): LABBLOO in the last 48 hours.  BMP:   Recent Labs   Lab 03/17/22  2100   CREATININE 0.7     CBC: No results for input(s): WBC, HGB, HCT, PLT in the last 48 hours.  CMP:   Recent Labs   Lab 03/17/22  2100   CREATININE 0.7   EGFRNONAA >60       Significant Imaging: I have reviewed all pertinent imaging results/findings within the past 24 hours.

## 2022-03-18 NOTE — ASSESSMENT & PLAN NOTE
"This patient does have evidence of infective focus  My overall impression is severe  sepsis. Vital signs were reviewed and noted in progress note.  Antibiotics given-   Antibiotics (From admission, onward)            Start     Stop Route Frequency Ordered    03/16/22 2200  vancomycin 2 g in dextrose 5 % 500 mL IVPB         -- IV Every 24 hours (non-standard times) 03/15/22 2315    03/16/22 0345  cefepime in dextrose 5 % IVPB 2 g         -- IV Every 8 hours (non-standard times) 03/15/22 2145    03/15/22 2156  vancomycin - pharmacy to dose  (vancomycin IVPB)        "And" Linked Group Details    -- IV pharmacy to manage frequency 03/15/22 2058        Cultures were taken-   Microbiology Results (last 7 days)     Procedure Component Value Units Date/Time    Blood culture x two cultures. Draw prior to antibiotics. [[id number]] Collected: 03/15/22 1931    Order Status: Completed Specimen: Blood from Peripheral, Antecubital, Left Updated: 03/18/22 0612     Blood Culture, Routine No Growth to date      No Growth to date      No Growth to date    Narrative:      Aerobic and anaerobic    Blood culture x two cultures. Draw prior to antibiotics. [[id number]] Collected: 03/15/22 1917    Order Status: Completed Specimen: Blood from Peripheral, Antecubital, Right Updated: 03/18/22 0612     Blood Culture, Routine No Growth to date      No Growth to date      No Growth to date    Narrative:      Aerobic and anaerobic        Latest lactate reviewed, they are-  Recent Labs   Lab 03/15/22  1916 03/15/22  2233 03/16/22  1125   LACTATE 2.5* 2.3* 2.0       Organ dysfunction indicated by Encephalopathy   Source- Sacral Wound    Source control Achieved by- IV abx    3/15 -afebrile.  HR 84-94.  WBC 61273. Lactic acid 2.5.  -source of infection likely sacral decubitus ulcer/wound.  -hemodynamically stable.  -continue IV vancomycin, IV cefepime empirically.  -follow up on cultures.    3/16 Continue current treatment  Consult General surgery for " I&D    3/17- Case with Dr Luque, who thinks palliative care conversation with family is needed.   Patient is not a surgical candidate.  Continue wound care and IV antibiotics     3/18/22  -Continue current medical management plan.   -Palliative care had lengthy discussion with family today. At this point, they are leaning towards hospice but did not commit.  They want to meet with the hospice team first and discuss in more detail as a family before making a decision. They plan to meet with hospice tomorrow.

## 2022-03-18 NOTE — PROCEDURES
Patient is a 72-year-old white female with dementia.  I was asked to examine and debride sacral decubitus.  Patient was seen at bedside with the wound care nurse. measurements obtained revealed a 7 x 7 x 4 cm stage 4 malodorous sacral decubitus.  There was undermining 4 cm from the 11 to 1 o'clock position.  Time-out was performed at the bedside.  Risk and benefits of debridement were discussed with the  and son last evening  Excisional debridement was accomplished using scissors and a scalpel.  Debridement included subcutaneous tissue, skin, muscle, subcutaneous tissue and bone.  No tendon was debrided.  Patient had obvious osteomyelitis of the coccyx.  Soft necrotic pieces of the coccyx were removed.  There was minimal bleeding noted.  Some of the muscle fibers were very adherent.  After discussion with the wound care nurse we elected to use Dakin solution for the next few days in order to help loosen and debride the wound.  The wound is not amendable to a wound VAC at this time secondary to continued necrotic tissue.  Wound dressing changes placed in the chart by the wound care nurse.  Wound dressed by the wound care nurse after debridement.  No evidence of bleeding noted.  Please call the surgeon on call on Monday if further debridement is needed at that time.

## 2022-03-18 NOTE — PLAN OF CARE
Patient remains free of falls and injuries during shift. IV fluids continued. Miralax given during shift. No other signs of pain or discomfort. Will continue to monitor.

## 2022-03-18 NOTE — ASSESSMENT & PLAN NOTE
3/15 -Per report, stage IV sacral decubitus ulcer.  -IV vancomycin, IV cefepime.  -wound care consult.  3/16 Add Zinc and Vit C    3/17  BC X 2 NGTD  Continue IV antibiotic   Wound care   General surgery following     3/18/22  -Wound care   -Supportive care

## 2022-03-18 NOTE — PROGRESS NOTES
[facility] Medicine  Progress Note    Patient Name: Michela Arriaza  MRN: [id number]  Patient Class: OP- Observation   Admission Date: 3/15/2022  Length of Stay: 0 days  Attending Physician: Bon Hollis MD  Primary Care Provider: Anatoliy Mayberry MD        Subjective:     Principal Problem:Severe sepsis        HPI:  Ms. Arriaza is a 72-year-old  female with a history of dementia, HTN, currently resident of nursing home, known history of sacral decubitus ulcer, was evaluated by a physician at the nursing home earlier today.  She was advised to go to the emergency department for further evaluation of worsening sacral decubitus wound.  Most of the information obtained from patient's son at the bedside.  Patient is currently on oral antibiotic (unknown medication) for UTI.  In the ED she is afebrile.  HR 84-94.  /80.  WBC 21574. 0% bands.  Lactic acid mildly elevated 2.5.  Procalcitonin 0.39.  UA is unremarkable (has been on oral antibiotics recently).  Patient is currently confused, and per family members at the bedside, though she has dementia, her current mental status is not normal.  Usually she is talkative and able to maintain some conversation.  Currently disoriented, unable to follow commands. CT head is negative for acute intracranial pathology.  Started on IV vancomycin, IV cefepime.    Admitting diagnosis:  Acute encephalopathy.  Sepsis secondary to sacral decubitus ulcer.  Recent UTI.  Advanced dementia.      Overview/Hospital Course:  The patient was monitored closely during her stay. She was kept on continuous telemetry monitoring. She was given IVF for hydration and started on IV Abx for Infected sacral wound. Wound care was consulted for evaluation and recommendations of her sacral wound.   3/16 Consult General surgery for I&D of sacral wound  As of 3/17/2022, vital signs stable. Continuing IV antibiotics for sacral decubitus. General Surgery consulted, reviewed case  with Dr Luque, who thinks palliative care conversation with family is needed. Patient is not a surgical candidate. Will continue wound care, as well. As of 3/18/22 no change in status. Continue current medical management plan. Palliative care had lengthy discussion with family today. At this point, they are leaning towards hospice but did not commit.  They want to meet with the hospice team first and discuss in more detail as a family before making a decision. They plan to meet with hospice tomorrow.           Review of Systems   Unable to perform ROS: Dementia   Objective:     Vital Signs (Most Recent):  Temp: 98.2 °F (36.8 °C) (03/18/22 1132)  Pulse: 98 (03/18/22 1132)  Resp: 18 (03/18/22 1132)  BP: (!) 122/58 (03/18/22 1132)  SpO2: 98 % (03/18/22 1132)   Vital Signs (24h Range):  Temp:  [97.4 °F (36.3 °C)-99.6 °F (37.6 °C)] 98.2 °F (36.8 °C)  Pulse:  [] 98  Resp:  [17-18] 18  SpO2:  [94 %-98 %] 98 %  BP: (121-174)/(58-79) 122/58     Weight: 82.8 kg (182 lb 8.7 oz)  Body mass index is 30.38 kg/m².    Intake/Output Summary (Last 24 hours) at 3/18/2022 1500  Last data filed at 3/18/2022 1200  Gross per 24 hour   Intake 2441.73 ml   Output --   Net 2441.73 ml      Physical Exam  Vitals and nursing note reviewed.   Constitutional:       General: She is not in acute distress.     Appearance: She is not ill-appearing or diaphoretic.   HENT:      Head: Normocephalic and atraumatic.      Mouth/Throat:      Mouth: Mucous membranes are dry.   Eyes:      General: No scleral icterus.        Right eye: No discharge.         Left eye: No discharge.      Conjunctiva/sclera: Conjunctivae normal.   Cardiovascular:      Rate and Rhythm: Normal rate and regular rhythm.      Pulses: Normal pulses.      Heart sounds: No murmur heard.  Pulmonary:      Effort: Pulmonary effort is normal. No respiratory distress.      Breath sounds: No wheezing or rhonchi.      Comments: BBS diminished  Abdominal:      General: Bowel sounds are  "normal. There is no distension.      Palpations: Abdomen is soft.      Tenderness: There is no abdominal tenderness. There is no guarding.   Genitourinary:     Comments: Not examined  Musculoskeletal:      Cervical back: Normal range of motion and neck supple. No rigidity or tenderness.      Right lower leg: Edema present.      Left lower leg: Edema present.      Comments: Debility       Skin:     General: Skin is warm and dry.      Capillary Refill: Capillary refill takes 2 to 3 seconds.      Comments: Sacral dressing   Neurological:      Mental Status: She is alert. She is disoriented.      Motor: Weakness present.   Psychiatric:         Attention and Perception: Attention normal.         Speech: Speech normal.       Significant Labs: All pertinent labs within the past 24 hours have been reviewed.  Blood Culture: No results for input(s): LABBLOO in the last 48 hours.  BMP:   Recent Labs   Lab 03/17/22  2100   CREATININE 0.7     CBC: No results for input(s): WBC, HGB, HCT, PLT in the last 48 hours.  CMP:   Recent Labs   Lab 03/17/22  2100   CREATININE 0.7   EGFRNONAA >60       Significant Imaging: I have reviewed all pertinent imaging results/findings within the past 24 hours.      Assessment/Plan:      * Severe sepsis secondary to Infected sacral decubitus  This patient does have evidence of infective focus  My overall impression is severe  sepsis. Vital signs were reviewed and noted in progress note.  Antibiotics given-   Antibiotics (From admission, onward)            Start     Stop Route Frequency Ordered    03/16/22 2200  vancomycin 2 g in dextrose 5 % 500 mL IVPB         -- IV Every 24 hours (non-standard times) 03/15/22 2315    03/16/22 0345  cefepime in dextrose 5 % IVPB 2 g         -- IV Every 8 hours (non-standard times) 03/15/22 2145    03/15/22 2156  vancomycin - pharmacy to dose  (vancomycin IVPB)        "And" Linked Group Details    -- IV pharmacy to manage frequency 03/15/22 2058        Cultures were " taken-   Microbiology Results (last 7 days)     Procedure Component Value Units Date/Time    Blood culture x two cultures. Draw prior to antibiotics. [[id number]] Collected: 03/15/22 1931    Order Status: Completed Specimen: Blood from Peripheral, Antecubital, Left Updated: 03/18/22 0612     Blood Culture, Routine No Growth to date      No Growth to date      No Growth to date    Narrative:      Aerobic and anaerobic    Blood culture x two cultures. Draw prior to antibiotics. [[id number]] Collected: 03/15/22 1917    Order Status: Completed Specimen: Blood from Peripheral, Antecubital, Right Updated: 03/18/22 0612     Blood Culture, Routine No Growth to date      No Growth to date      No Growth to date    Narrative:      Aerobic and anaerobic        Latest lactate reviewed, they are-  Recent Labs   Lab 03/15/22  1916 03/15/22  2233 03/16/22  1125   LACTATE 2.5* 2.3* 2.0       Organ dysfunction indicated by Encephalopathy   Source- Sacral Wound    Source control Achieved by- IV abx    3/15 -afebrile.  HR 84-94.  WBC 48487. Lactic acid 2.5.  -source of infection likely sacral decubitus ulcer/wound.  -hemodynamically stable.  -continue IV vancomycin, IV cefepime empirically.  -follow up on cultures.    3/16 Continue current treatment  Consult General surgery for I&D    3/17- Case with Dr Luque, who thinks palliative care conversation with family is needed.   Patient is not a surgical candidate.  Continue wound care and IV antibiotics     3/18/22  -Continue current medical management plan.   -Palliative care had lengthy discussion with family today. At this point, they are leaning towards hospice but did not commit.  They want to meet with the hospice team first and discuss in more detail as a family before making a decision. They plan to meet with hospice tomorrow.       Hypoalbuminemia  Boost TID   Encourage oral intake     Normocytic anemia  Monitor and transfuse if needed       Acute metabolic encephalopathy  3/15  -AMS likely secondary to underlying sacral decubitus ulcer/sepsis.  In setting of dementia.  -CT head negative for acute intracranial pathology.  -neurochecks every 4 hours.    3/17  Pt has severe dementia   Patient awake and back at baseline, after starting IV antibiotics, according to patient's son at bedside.       Resolved     Decubitus ulcer of sacral region  3/15 -Per report, stage IV sacral decubitus ulcer.  -IV vancomycin, IV cefepime.  -wound care consult.  3/16 Add Zinc and Vit C    3/17  BC X 2 NGTD  Continue IV antibiotic   Wound care   General surgery following     3/18/22  -Wound care   -Supportive care           Dementia without behavioral disturbance with Debility  3/16 Fall and skin precautions  Turn Q 2 hours              VTE Risk Mitigation (From admission, onward)         Ordered     Place sequential compression device  Until discontinued         03/15/22 2058                Discharge Planning   BERNA:      Code Status: DNR   Is the patient medically ready for discharge?:     Reason for patient still in hospital (select all that apply): Patient trending condition, Laboratory test and Treatment  Discharge Plan A: Return to nursing home                  Liseth Ramirez NP  Department of Hospital Medicine   [facility] - Med Surg

## 2022-03-18 NOTE — PROGRESS NOTES
Pharmacokinetic Assessment Follow Up: IV Vancomycin    Vancomycin serum concentration assessment(s):    The trough level was drawn correctly and can be used to guide therapy at this time. The measurement is within the desired definitive target range of 15 to 20 mcg/mL.    Vancomycin Regimen Plan:    Due to loss of IV access, dose was given about 6 hours late. Will continue current regimen of vancomycin 2000 mg IV every 24 hours with next serum trough concentration measured at 0300 prior to 5th total dose on 03/20    Drug levels (last 3 results):  Recent Labs   Lab Result Units 03/17/22  2100   Vancomycin-Trough ug/mL 16.9       Pharmacy will continue to follow and monitor vancomycin.    Please contact pharmacy at extension [phone] for questions regarding this assessment.    Thank you for the consult,   Vanita Uriostegui       Patient brief summary:  Michela Arriaza is a 72 y.o. female initiated on antimicrobial therapy with IV Vancomycin for treatment of skin & soft tissue infection with bone involvement    The patient's current regimen is vancomycin 2000 mg IV every 24 hours.    Drug Allergies:   Review of patient's allergies indicates:   Allergen Reactions    Iodine and iodide containing products     Levofloxacin Other (See Comments)       Actual Body Weight:   82.8 kg    Renal Function:   Estimated Creatinine Clearance: 76 mL/min (based on SCr of 0.7 mg/dL).,     Dialysis Method (if applicable):  N/A    CBC (last 72 hours):  Recent Labs   Lab Result Units 03/15/22  1916 03/16/22  0450   WBC K/uL 11.91 9.30   Hemoglobin g/dL 11.3* 10.7*   Hematocrit % 36.3* 34.1*   Platelets K/uL 302 220   Gran % % 74.0* 60.8   Lymph % % 10.7* 16.5*   Mono % % 8.1 10.9   Eosinophil % % 6.2 11.1*   Basophil % % 0.3 0.2   Differential Method  Automated Automated       Metabolic Panel (last 72 hours):  Recent Labs   Lab Result Units 03/15/22  1916 03/15/22  1924 03/16/22  0450 03/17/22  2100   Sodium mmol/L 143  --  141  --     Potassium mmol/L 4.0  --  3.6  --    Chloride mmol/L 107  --  111*  --    CO2 mmol/L 27  --  22*  --    Glucose mg/dL 166*  --  108  --    Glucose, UA   --  Negative  --   --    BUN mg/dL 22  --  19  --    Creatinine mg/dL 0.8  --  0.6 0.7   Albumin g/dL 2.5*  --  2.0*  --    Total Bilirubin mg/dL 0.3  --  0.5  --    Alkaline Phosphatase U/L 106  --  96  --    AST U/L 29  --  24  --    ALT U/L 19  --  14  --    Magnesium mg/dL  --   --  1.8  --        Vancomycin Administrations:  vancomycin given in the last 96 hours                     vancomycin 2 g in dextrose 5 % 500 mL IVPB (mg) 2,000 mg New Bag 03/17/22 2213     2,000 mg New Bag 03/16/22 2217    vancomycin 1.75 g in 5 % dextrose 500 mL IVPB (mg) 1,750 mg New Bag 03/15/22 2205                    Microbiologic Results:  Microbiology Results (last 7 days)       Procedure Component Value Units Date/Time    Blood culture x two cultures. Draw prior to antibiotics. [[id number]] Collected: 03/15/22 1917    Order Status: Completed Specimen: Blood from Peripheral, Antecubital, Right Updated: 03/17/22 0613     Blood Culture, Routine No Growth to date      No Growth to date    Narrative:      Aerobic and anaerobic    Blood culture x two cultures. Draw prior to antibiotics. [[id number]] Collected: 03/15/22 1931    Order Status: Completed Specimen: Blood from Peripheral, Antecubital, Left Updated: 03/17/22 0613     Blood Culture, Routine No Growth to date      No Growth to date    Narrative:      Aerobic and anaerobic

## 2022-03-19 LAB
CREAT SERPL-MCNC: 0.7 MG/DL (ref 0.5–1.4)
EST. GFR  (AFRICAN AMERICAN): >60 ML/MIN/1.73 M^2
EST. GFR  (NON AFRICAN AMERICAN): >60 ML/MIN/1.73 M^2

## 2022-03-19 PROCEDURE — 21400001 HC TELEMETRY ROOM

## 2022-03-19 PROCEDURE — 36415 COLL VENOUS BLD VENIPUNCTURE: CPT | Performed by: INTERNAL MEDICINE

## 2022-03-19 PROCEDURE — S5010 5% DEXTROSE AND 0.45% SALINE: HCPCS | Performed by: NURSE PRACTITIONER

## 2022-03-19 PROCEDURE — 63600175 PHARM REV CODE 636 W HCPCS: Performed by: FAMILY MEDICINE

## 2022-03-19 PROCEDURE — 25000003 PHARM REV CODE 250: Performed by: EMERGENCY MEDICINE

## 2022-03-19 PROCEDURE — 11000001 HC ACUTE MED/SURG PRIVATE ROOM

## 2022-03-19 PROCEDURE — 25000003 PHARM REV CODE 250: Performed by: NURSE PRACTITIONER

## 2022-03-19 PROCEDURE — 25000003 PHARM REV CODE 250: Performed by: FAMILY MEDICINE

## 2022-03-19 PROCEDURE — 82565 ASSAY OF CREATININE: CPT | Performed by: INTERNAL MEDICINE

## 2022-03-19 RX ADMIN — CEFEPIME HYDROCHLORIDE 2 G: 2 INJECTION, SOLUTION INTRAVENOUS at 03:03

## 2022-03-19 RX ADMIN — OXYCODONE HYDROCHLORIDE AND ACETAMINOPHEN 500 MG: 500 TABLET ORAL at 08:03

## 2022-03-19 RX ADMIN — DEXTROSE AND SODIUM CHLORIDE: 5; .45 INJECTION, SOLUTION INTRAVENOUS at 05:03

## 2022-03-19 RX ADMIN — Medication 400 MG: at 08:03

## 2022-03-19 RX ADMIN — CEFEPIME HYDROCHLORIDE 2 G: 2 INJECTION, SOLUTION INTRAVENOUS at 11:03

## 2022-03-19 RX ADMIN — VANCOMYCIN HYDROCHLORIDE 2000 MG: 500 INJECTION, POWDER, LYOPHILIZED, FOR SOLUTION INTRAVENOUS at 04:03

## 2022-03-19 RX ADMIN — DAKIN'S SOLUTION 0.125% (QUARTER STRENGTH): 0.12 SOLUTION at 08:03

## 2022-03-19 RX ADMIN — ZINC SULFATE 220 MG (50 MG) CAPSULE 220 MG: CAPSULE at 08:03

## 2022-03-19 RX ADMIN — CEFEPIME HYDROCHLORIDE 2 G: 2 INJECTION, SOLUTION INTRAVENOUS at 08:03

## 2022-03-19 NOTE — PROGRESS NOTES
[facility] Medicine  Progress Note    Patient Name: Michela Arriaza  MRN: [id number]  Patient Class: IP- Inpatient   Admission Date: 3/15/2022  Length of Stay: 1 days  Attending Physician: Bon Hollis MD  Primary Care Provider: Anatoliy Mayberry MD        Subjective:     Principal Problem:Severe sepsis        HPI:  Ms. Arriaza is a 72-year-old  female with a history of dementia, HTN, currently resident of nursing home, known history of sacral decubitus ulcer, was evaluated by a physician at the nursing home earlier today.  She was advised to go to the emergency department for further evaluation of worsening sacral decubitus wound.  Most of the information obtained from patient's son at the bedside.  Patient is currently on oral antibiotic (unknown medication) for UTI.  In the ED she is afebrile.  HR 84-94.  /80.  WBC 40666. 0% bands.  Lactic acid mildly elevated 2.5.  Procalcitonin 0.39.  UA is unremarkable (has been on oral antibiotics recently).  Patient is currently confused, and per family members at the bedside, though she has dementia, her current mental status is not normal.  Usually she is talkative and able to maintain some conversation.  Currently disoriented, unable to follow commands. CT head is negative for acute intracranial pathology.  Started on IV vancomycin, IV cefepime.    Admitting diagnosis:  Acute encephalopathy.  Sepsis secondary to sacral decubitus ulcer.  Recent UTI.  Advanced dementia.      Overview/Hospital Course:  The patient was monitored closely during her stay. She was kept on continuous telemetry monitoring. She was given IVF for hydration and started on IV Abx for Infected sacral wound. Wound care was consulted for evaluation and recommendations of her sacral wound.   3/16 Consult General surgery for I&D of sacral wound  As of 3/17/2022, vital signs stable. Continuing IV antibiotics for sacral decubitus. General Surgery consulted, reviewed case  with Dr Luque, who thinks palliative care conversation with family is needed. Patient is not a surgical candidate. Will continue wound care, as well. As of 3/18/22 no change in status. Continue current medical management plan. Palliative care had lengthy discussion with family today. At this point, they are leaning towards hospice but did not commit.  They want to meet with the hospice team first and discuss in more detail as a family before making a decision. They plan to meet with hospice tomorrow. As of 3/19/22 no change in status. Family has decided that they aren't ready to put patient on hospice. Will plan on discharging back to NH soon.         Review of Systems   Unable to perform ROS: Dementia   Objective:     Vital Signs (Most Recent):  Temp: 99.1 °F (37.3 °C) (03/19/22 1158)  Pulse: 94 (03/19/22 1158)  Resp: 18 (03/19/22 1158)  BP: 134/82 (03/19/22 1158)  SpO2: 97 % (03/19/22 1158) Vital Signs (24h Range):  Temp:  [98 °F (36.7 °C)-99.1 °F (37.3 °C)] 99.1 °F (37.3 °C)  Pulse:  [] 94  Resp:  [14-18] 18  SpO2:  [96 %-97 %] 97 %  BP: (134-143)/(78-82) 134/82     Weight: 82.8 kg (182 lb 8.7 oz)  Body mass index is 30.38 kg/m².    Intake/Output Summary (Last 24 hours) at 3/19/2022 1713  Last data filed at 3/19/2022 0604  Gross per 24 hour   Intake 1562.46 ml   Output --   Net 1562.46 ml      Physical Exam  Vitals and nursing note reviewed.   Constitutional:       General: She is not in acute distress.     Appearance: She is not ill-appearing or diaphoretic.   HENT:      Head: Normocephalic and atraumatic.      Mouth/Throat:      Mouth: Mucous membranes are dry.   Eyes:      General: No scleral icterus.        Right eye: No discharge.         Left eye: No discharge.      Conjunctiva/sclera: Conjunctivae normal.   Cardiovascular:      Rate and Rhythm: Normal rate and regular rhythm.      Pulses: Normal pulses.      Heart sounds: No murmur heard.  Pulmonary:      Effort: Pulmonary effort is normal. No  "respiratory distress.      Breath sounds: No wheezing or rhonchi.      Comments: BBS diminished  Abdominal:      General: Bowel sounds are normal. There is no distension.      Palpations: Abdomen is soft.      Tenderness: There is no abdominal tenderness. There is no guarding.   Genitourinary:     Comments: Not examined  Musculoskeletal:      Cervical back: Normal range of motion and neck supple. No rigidity or tenderness.      Right lower leg: No edema.      Left lower leg: No edema.      Comments: Debility       Skin:     General: Skin is warm and dry.      Capillary Refill: Capillary refill takes 2 to 3 seconds.      Comments: Sacral dressing   Neurological:      Mental Status: She is lethargic and disoriented.      Motor: Weakness present.   Psychiatric:         Attention and Perception: Attention normal.         Speech: Speech normal.       Significant Labs: All pertinent labs within the past 24 hours have been reviewed.  BMP:   Recent Labs   Lab 03/19/22  0718   CREATININE 0.7     CBC: No results for input(s): WBC, HGB, HCT, PLT in the last 48 hours.  CMP:   Recent Labs   Lab 03/17/22  2100 03/19/22  0718   CREATININE 0.7 0.7   EGFRNONAA >60 >60       Significant Imaging: I have reviewed all pertinent imaging results/findings within the past 24 hours.      Assessment/Plan:      * Severe sepsis secondary to Infected sacral decubitus  This patient does have evidence of infective focus  My overall impression is severe  sepsis. Vital signs were reviewed and noted in progress note.  Antibiotics given-   Antibiotics (From admission, onward)            Start     Stop Route Frequency Ordered    03/16/22 2200  vancomycin 2 g in dextrose 5 % 500 mL IVPB         -- IV Every 24 hours (non-standard times) 03/15/22 2315    03/16/22 0345  cefepime in dextrose 5 % IVPB 2 g         -- IV Every 8 hours (non-standard times) 03/15/22 2145    03/15/22 2156  vancomycin - pharmacy to dose  (vancomycin IVPB)        "And" Linked Group " Details    -- IV pharmacy to manage frequency 03/15/22 2058        Cultures were taken-   Microbiology Results (last 7 days)     Procedure Component Value Units Date/Time    Blood culture x two cultures. Draw prior to antibiotics. [[id number]] Collected: 03/15/22 1917    Order Status: Completed Specimen: Blood from Peripheral, Antecubital, Right Updated: 03/19/22 0612     Blood Culture, Routine No Growth to date      No Growth to date      No Growth to date      No Growth to date    Narrative:      Aerobic and anaerobic    Blood culture x two cultures. Draw prior to antibiotics. [[id number]] Collected: 03/15/22 1931    Order Status: Completed Specimen: Blood from Peripheral, Antecubital, Left Updated: 03/19/22 0612     Blood Culture, Routine No Growth to date      No Growth to date      No Growth to date      No Growth to date    Narrative:      Aerobic and anaerobic        Latest lactate reviewed, they are-  No results for input(s): LACTATE in the last 72 hours.    Organ dysfunction indicated by Encephalopathy   Source- Sacral Wound    Source control Achieved by- IV abx    3/15 -afebrile.  HR 84-94.  WBC 14131. Lactic acid 2.5.  -source of infection likely sacral decubitus ulcer/wound.  -hemodynamically stable.  -continue IV vancomycin, IV cefepime empirically.  -follow up on cultures.    3/16 Continue current treatment  Consult General surgery for I&D    3/17- Case with Dr Luque, who thinks palliative care conversation with family is needed.   Patient is not a surgical candidate.  Continue wound care and IV antibiotics     3/19/22  -Continue current medical management plan.   -Family has decided that they aren't ready to put patient on hospice. Will plan on discharging back to NH soon.     Hypoalbuminemia  Boost TID   Encourage oral intake     Normocytic anemia  Monitor and transfuse if needed       Acute metabolic encephalopathy  3/15 -AMS likely secondary to underlying sacral decubitus ulcer/sepsis.  In setting  of dementia.  -CT head negative for acute intracranial pathology.  -neurochecks every 4 hours.    3/17  Pt has severe dementia   Patient awake and back at baseline, after starting IV antibiotics, according to patient's son at bedside.       Resolved     Decubitus ulcer of sacral region  3/15 -Per report, stage IV sacral decubitus ulcer.  -IV vancomycin, IV cefepime.  -wound care consult.  3/16 Add Zinc and Vit C    3/17  BC X 2 NGTD  Continue IV antibiotic   Wound care   General surgery following     3/19/22  -Wound care   -Supportive care           Dementia without behavioral disturbance with Debility  Fall and skin precautions  Turn Q 2 hours              VTE Risk Mitigation (From admission, onward)         Ordered     Place sequential compression device  Until discontinued         03/15/22 2058                Discharge Planning   BERNA:      Code Status: DNR   Is the patient medically ready for discharge?:     Reason for patient still in hospital (select all that apply): Patient trending condition and Pending disposition  Discharge Plan A: Return to nursing home                  Liseth Ramirez NP  Department of Hospital Medicine   [facility] - Med S[facility]

## 2022-03-19 NOTE — ASSESSMENT & PLAN NOTE
3/15 -Per report, stage IV sacral decubitus ulcer.  -IV vancomycin, IV cefepime.  -wound care consult.  3/16 Add Zinc and Vit C    3/17  BC X 2 NGTD  Continue IV antibiotic   Wound care   General surgery following     3/19/22  -Wound care   -Supportive care

## 2022-03-19 NOTE — PLAN OF CARE
Patient remains free of falls and injuries during shift. No signs of pain or discomfort. Telemonitoring in place. Continuous fluids running. Dressing changed per shift. PRN miralax given. Will continue to monitor.

## 2022-03-19 NOTE — SUBJECTIVE & OBJECTIVE
Review of Systems   Unable to perform ROS: Dementia   Objective:     Vital Signs (Most Recent):  Temp: 99.1 °F (37.3 °C) (03/19/22 1158)  Pulse: 94 (03/19/22 1158)  Resp: 18 (03/19/22 1158)  BP: 134/82 (03/19/22 1158)  SpO2: 97 % (03/19/22 1158) Vital Signs (24h Range):  Temp:  [98 °F (36.7 °C)-99.1 °F (37.3 °C)] 99.1 °F (37.3 °C)  Pulse:  [] 94  Resp:  [14-18] 18  SpO2:  [96 %-97 %] 97 %  BP: (134-143)/(78-82) 134/82     Weight: 82.8 kg (182 lb 8.7 oz)  Body mass index is 30.38 kg/m².    Intake/Output Summary (Last 24 hours) at 3/19/2022 1713  Last data filed at 3/19/2022 0604  Gross per 24 hour   Intake 1562.46 ml   Output --   Net 1562.46 ml      Physical Exam  Vitals and nursing note reviewed.   Constitutional:       General: She is not in acute distress.     Appearance: She is not ill-appearing or diaphoretic.   HENT:      Head: Normocephalic and atraumatic.      Mouth/Throat:      Mouth: Mucous membranes are dry.   Eyes:      General: No scleral icterus.        Right eye: No discharge.         Left eye: No discharge.      Conjunctiva/sclera: Conjunctivae normal.   Cardiovascular:      Rate and Rhythm: Normal rate and regular rhythm.      Pulses: Normal pulses.      Heart sounds: No murmur heard.  Pulmonary:      Effort: Pulmonary effort is normal. No respiratory distress.      Breath sounds: No wheezing or rhonchi.      Comments: BBS diminished  Abdominal:      General: Bowel sounds are normal. There is no distension.      Palpations: Abdomen is soft.      Tenderness: There is no abdominal tenderness. There is no guarding.   Genitourinary:     Comments: Not examined  Musculoskeletal:      Cervical back: Normal range of motion and neck supple. No rigidity or tenderness.      Right lower leg: No edema.      Left lower leg: No edema.      Comments: Debility       Skin:     General: Skin is warm and dry.      Capillary Refill: Capillary refill takes 2 to 3 seconds.      Comments: Sacral dressing    Neurological:      Mental Status: She is lethargic and disoriented.      Motor: Weakness present.   Psychiatric:         Attention and Perception: Attention normal.         Speech: Speech normal.       Significant Labs: All pertinent labs within the past 24 hours have been reviewed.  BMP:   Recent Labs   Lab 03/19/22  0718   CREATININE 0.7     CBC: No results for input(s): WBC, HGB, HCT, PLT in the last 48 hours.  CMP:   Recent Labs   Lab 03/17/22  2100 03/19/22  0718   CREATININE 0.7 0.7   EGFRNONAA >60 >60       Significant Imaging: I have reviewed all pertinent imaging results/findings within the past 24 hours.

## 2022-03-19 NOTE — NURSING
[facility] called and informed Nurse that family decided that they aren't ready to put patient on hospice. Family has received all necessary information and has no further questions at this time.

## 2022-03-19 NOTE — PLAN OF CARE
Patient safety maintained through shift. Patient wound treatment performed, procedure tolerated well. All needs met at this time. Family is at bedside.

## 2022-03-19 NOTE — ASSESSMENT & PLAN NOTE
"This patient does have evidence of infective focus  My overall impression is severe  sepsis. Vital signs were reviewed and noted in progress note.  Antibiotics given-   Antibiotics (From admission, onward)            Start     Stop Route Frequency Ordered    03/16/22 2200  vancomycin 2 g in dextrose 5 % 500 mL IVPB         -- IV Every 24 hours (non-standard times) 03/15/22 2315    03/16/22 0345  cefepime in dextrose 5 % IVPB 2 g         -- IV Every 8 hours (non-standard times) 03/15/22 2145    03/15/22 2156  vancomycin - pharmacy to dose  (vancomycin IVPB)        "And" Linked Group Details    -- IV pharmacy to manage frequency 03/15/22 2058        Cultures were taken-   Microbiology Results (last 7 days)     Procedure Component Value Units Date/Time    Blood culture x two cultures. Draw prior to antibiotics. [[id number]] Collected: 03/15/22 1917    Order Status: Completed Specimen: Blood from Peripheral, Antecubital, Right Updated: 03/19/22 0612     Blood Culture, Routine No Growth to date      No Growth to date      No Growth to date      No Growth to date    Narrative:      Aerobic and anaerobic    Blood culture x two cultures. Draw prior to antibiotics. [[id number]] Collected: 03/15/22 1931    Order Status: Completed Specimen: Blood from Peripheral, Antecubital, Left Updated: 03/19/22 0612     Blood Culture, Routine No Growth to date      No Growth to date      No Growth to date      No Growth to date    Narrative:      Aerobic and anaerobic        Latest lactate reviewed, they are-  No results for input(s): LACTATE in the last 72 hours.    Organ dysfunction indicated by Encephalopathy   Source- Sacral Wound    Source control Achieved by- IV abx    3/15 -afebrile.  HR 84-94.  WBC 21455. Lactic acid 2.5.  -source of infection likely sacral decubitus ulcer/wound.  -hemodynamically stable.  -continue IV vancomycin, IV cefepime empirically.  -follow up on cultures.    3/16 Continue current treatment  Consult General " surgery for I&D    3/17- Case with Dr Luque, who thinks palliative care conversation with family is needed.   Patient is not a surgical candidate.  Continue wound care and IV antibiotics     3/19/22  -Continue current medical management plan.   -Family has decided that they aren't ready to put patient on hospice. Will plan on discharging back to NH soon.

## 2022-03-20 VITALS
OXYGEN SATURATION: 96 % | HEIGHT: 65 IN | BODY MASS INDEX: 29.97 KG/M2 | TEMPERATURE: 101 F | SYSTOLIC BLOOD PRESSURE: 134 MMHG | RESPIRATION RATE: 18 BRPM | DIASTOLIC BLOOD PRESSURE: 75 MMHG | WEIGHT: 179.88 LBS | HEART RATE: 92 BPM

## 2022-03-20 PROBLEM — G93.41 ACUTE METABOLIC ENCEPHALOPATHY: Status: RESOLVED | Noted: 2022-03-15 | Resolved: 2022-03-20

## 2022-03-20 LAB — VANCOMYCIN TROUGH SERPL-MCNC: 20.1 UG/ML (ref 10–22)

## 2022-03-20 PROCEDURE — 36415 COLL VENOUS BLD VENIPUNCTURE: CPT | Performed by: INTERNAL MEDICINE

## 2022-03-20 PROCEDURE — 80202 ASSAY OF VANCOMYCIN: CPT | Performed by: INTERNAL MEDICINE

## 2022-03-20 PROCEDURE — 63600175 PHARM REV CODE 636 W HCPCS: Performed by: INTERNAL MEDICINE

## 2022-03-20 PROCEDURE — 25000003 PHARM REV CODE 250: Performed by: NURSE PRACTITIONER

## 2022-03-20 PROCEDURE — 63600175 PHARM REV CODE 636 W HCPCS: Performed by: FAMILY MEDICINE

## 2022-03-20 PROCEDURE — 25000003 PHARM REV CODE 250: Performed by: INTERNAL MEDICINE

## 2022-03-20 PROCEDURE — 25000003 PHARM REV CODE 250: Performed by: EMERGENCY MEDICINE

## 2022-03-20 RX ORDER — LINEZOLID 600 MG/1
600 TABLET, FILM COATED ORAL EVERY 12 HOURS
Qty: 20 TABLET | Refills: 0 | Status: SHIPPED | OUTPATIENT
Start: 2022-03-20 | End: 2022-03-30

## 2022-03-20 RX ORDER — ASCORBIC ACID 500 MG
500 TABLET ORAL 2 TIMES DAILY
Qty: 60 TABLET | Refills: 0 | Status: SHIPPED | OUTPATIENT
Start: 2022-03-20 | End: 2022-04-19

## 2022-03-20 RX ORDER — ACETAMINOPHEN 650 MG/1
650 SUPPOSITORY RECTAL ONCE
Status: COMPLETED | OUTPATIENT
Start: 2022-03-20 | End: 2022-03-20

## 2022-03-20 RX ORDER — ZINC SULFATE 50(220)MG
220 CAPSULE ORAL NIGHTLY
Qty: 15 CAPSULE | Refills: 0 | Status: SHIPPED | OUTPATIENT
Start: 2022-03-20 | End: 2022-04-04

## 2022-03-20 RX ADMIN — VANCOMYCIN HYDROCHLORIDE 1750 MG: 500 INJECTION, POWDER, LYOPHILIZED, FOR SOLUTION INTRAVENOUS at 07:03

## 2022-03-20 RX ADMIN — DAKIN'S SOLUTION 0.125% (QUARTER STRENGTH): 0.12 SOLUTION at 08:03

## 2022-03-20 RX ADMIN — OXYCODONE HYDROCHLORIDE AND ACETAMINOPHEN 500 MG: 500 TABLET ORAL at 08:03

## 2022-03-20 RX ADMIN — CEFEPIME HYDROCHLORIDE 2 G: 2 INJECTION, SOLUTION INTRAVENOUS at 11:03

## 2022-03-20 RX ADMIN — CEFEPIME HYDROCHLORIDE 2 G: 2 INJECTION, SOLUTION INTRAVENOUS at 03:03

## 2022-03-20 RX ADMIN — Medication 400 MG: at 08:03

## 2022-03-20 RX ADMIN — ACETAMINOPHEN 650 MG: 650 SUPPOSITORY RECTAL at 12:03

## 2022-03-20 NOTE — PLAN OF CARE
Problem: Skin Injury Risk Increased  Goal: Skin Health and Integrity  Outcome: Ongoing, Progressing     Problem: Adult Inpatient Plan of Care  Goal: Plan of Care Review  Outcome: Ongoing, Progressing     Problem: Adult Inpatient Plan of Care  Goal: Patient-Specific Goal (Individualized)  Outcome: Ongoing, Progressing     Problem: Adult Inpatient Plan of Care  Goal: Absence of Hospital-Acquired Illness or Injury  Outcome: Ongoing, Progressing     Problem: Adult Inpatient Plan of Care  Goal: Readiness for Transition of Care  Outcome: Ongoing, Progressing     Problem: Adjustment to Illness (Sepsis/Septic Shock)  Goal: Optimal Coping  Outcome: Ongoing, Progressing     Problem: Coping Ineffective  Goal: Effective Coping  Outcome: Ongoing, Progressing

## 2022-03-20 NOTE — DISCHARGE SUMMARY
[facility] Medicine  Discharge Summary      Patient Name: Michela Arriaza  MRN: [id number]  Patient Class: IP- Inpatient  Admission Date: 3/15/2022  Hospital Length of Stay: 2 days  Discharge Date and Time:  03/20/2022 3:50 PM  Attending Physician: No att. providers found   Discharging Provider: Liseth Ramirez NP  Primary Care Provider: Anatoliy Mayberry MD      HPI:   Ms. Arriaza is a 72-year-old  female with a history of dementia, HTN, currently resident of nursing home, known history of sacral decubitus ulcer, was evaluated by a physician at the nursing home earlier today.  She was advised to go to the emergency department for further evaluation of worsening sacral decubitus wound.  Most of the information obtained from patient's son at the bedside.  Patient is currently on oral antibiotic (unknown medication) for UTI.  In the ED she is afebrile.  HR 84-94.  /80.  WBC 23267. 0% bands.  Lactic acid mildly elevated 2.5.  Procalcitonin 0.39.  UA is unremarkable (has been on oral antibiotics recently).  Patient is currently confused, and per family members at the bedside, though she has dementia, her current mental status is not normal.  Usually she is talkative and able to maintain some conversation.  Currently disoriented, unable to follow commands. CT head is negative for acute intracranial pathology.  Started on IV vancomycin, IV cefepime.    Admitting diagnosis:  Acute encephalopathy.  Sepsis secondary to sacral decubitus ulcer.  Recent UTI.  Advanced dementia.      * No surgery found *      Hospital Course:   The patient was monitored closely during her stay. She was kept on continuous telemetry monitoring. She was given IVF for hydration and started on IV Abx for Infected sacral wound. Wound care was consulted for evaluation and recommendations of her sacral wound.   3/16 Consult General surgery for I&D of sacral wound  As of 3/17/2022, vital signs stable. Continuing IV  antibiotics for sacral decubitus. General Surgery consulted, reviewed case with Dr Luque, who thinks palliative care conversation with family is needed. Patient is not a surgical candidate. Will continue wound care, as well. As of 3/18/22 no change in status. Continue current medical management plan. Palliative care had lengthy discussion with family today. At this point, they are leaning towards hospice but did not commit.  They want to meet with the hospice team first and discuss in more detail as a family before making a decision. They plan to meet with hospice tomorrow. As of 3/19/22 no change in status. Family has decided that they aren't ready to put patient on hospice. Will plan on discharging back to NH soon. As of 3/20/22 pt stable no new issues. Patient seen and examined on the date of discharge and found stable for discharge. Home meds reconciled. Zyvox prescription given. Wound care ordered prescribed. Patient to follow-up with PCP in 3 days for hospital follow-up.        Goals of Care Treatment Preferences:  Code Status: DNR    Living Will: Yes              Consults:   Consults (From admission, onward)        Status Ordering Provider     Inpatient consult to Social Work  Once        Provider:  (Not yet assigned)    Completed ROBER MIGUEL     Inpatient consult to Palliative Care  Once        Provider:  Rober Miguel, NP    Completed KIMO AGEE     IP consult to case management  Once        Provider:  (Not yet assigned)    Completed SARAH BETH WARE     Inpatient consult to Social Work/Case Management  Once        Provider:  (Not yet assigned)    Completed SARAH BETH WARE          No new Assessment & Plan notes have been filed under this hospital service since the last note was generated.  Service: Hospital Medicine    Final Active Diagnoses:    Diagnosis Date Noted POA    PRINCIPAL PROBLEM:  Severe sepsis secondary to Infected sacral decubitus [A41.9, R65.20] 03/15/2022 Yes    Normocytic  anemia [D64.9] 03/16/2022 Yes    Hypoalbuminemia [E88.09] 03/16/2022 Yes    Decubitus ulcer of sacral region [L89.159] 03/15/2022 Yes    Dementia without behavioral disturbance with Debility [F03.90] 09/18/2021 Yes      Problems Resolved During this Admission:    Diagnosis Date Noted Date Resolved POA    Acute metabolic encephalopathy [G93.41] 03/15/2022 03/20/2022 Yes       Discharged Condition: stable    Disposition: Home or Self Care    Follow Up:   Follow-up Information     Anatoliy Mayberry MD Follow up in 3 day(s).    Specialty: Family Medicine  Why: for hospital follow-up of decubitus infection  Contact information:  [id number] [facility] [id number] [phone]                       Patient Instructions:      Change dressing (specify)   Order Comments: Sacral stage 4 pressure injury:   1. Cleanse with dakins   2. Fill with dakins moistened gauze   3. Pad with dry gauze and secure with tape   4. Change daily and prn excess drainage.     Activity as tolerated       Significant Diagnostic Studies: Labs:   BMP:   Recent Labs   Lab 03/19/22  0718   CREATININE 0.7   , CMP   Recent Labs   Lab 03/19/22  0718   CREATININE 0.7   ESTGFRAFRICA >60   EGFRNONAA >60   , CBC No results for input(s): WBC, HGB, HCT, PLT in the last 48 hours. and All labs within the past 24 hours have been reviewed  Microbiology:   Blood Culture   Lab Results   Component Value Date    LABBLOO No Growth to date 03/15/2022    LABBLOO No Growth to date 03/15/2022    LABBLOO No Growth to date 03/15/2022    LABBLOO No Growth to date 03/15/2022    LABBLOO No Growth to date 03/15/2022       Pending Diagnostic Studies:     None         Medications:  Reconciled Home Medications:      Medication List      START taking these medications    ascorbic acid (vitamin C) 500 MG tablet  Commonly known as: VITAMIN C  Take 1 tablet (500 mg total) by mouth 2 (two) times daily.     linezolid 600 mg Tab  Commonly known as: ZYVOX  Take 1 tablet (600 mg  total) by mouth every 12 (twelve) hours. for 10 days     sodium hypochlorite 0.125% external solution  Commonly known as: DAKIN'S SOLUTION  Apply topically 2 (two) times daily.     zinc sulfate 50 mg zinc (220 mg) capsule  Commonly known as: ZINCATE  Take 1 capsule (220 mg total) by mouth every evening. for 15 days        CONTINUE taking these medications    acetaminophen 500 mg Cap  Commonly known as: TYLENOL  Take 1 capsule (500 mg total) by mouth every 6 (six) hours as needed (pain, headache, temp >101).     ALPRAZolam 0.25 MG tablet  Commonly known as: XANAX  Take 1 tablet (0.25 mg total) by mouth 3 (three) times daily as needed for Anxiety or Insomnia.     amLODIPine 5 MG tablet  Commonly known as: NORVASC  Take 5 mg by mouth once daily.     aspirin 81 MG EC tablet  Commonly known as: ECOTRIN  Take 1 tablet (81 mg total) by mouth once daily.     calcium carbonate 600 mg calcium (1,500 mg) Tab  Commonly known as: OS-BELIA  Take 1 tablet (600 mg total) by mouth once daily.     docusate sodium 100 MG capsule  Commonly known as: COLACE  Take 1 capsule (100 mg total) by mouth 3 (three) times daily as needed.     losartan 50 MG tablet  Commonly known as: COZAAR  Take 50 mg by mouth once daily.     multivit with min-folic acid 0.4 mg Tab  Take 1 tablet by mouth once daily.     polyethylene glycol 17 gram/dose powder  Commonly known as: GLYCOLAX  Take 17 g by mouth once daily.     pravastatin 40 MG tablet  Commonly known as: PRAVACHOL  Take 1 tablet (40 mg total) by mouth once daily.        STOP taking these medications    HYDROcodone-acetaminophen 5-325 mg per tablet  Commonly known as: NORCO            Indwelling Lines/Drains at time of discharge:   Lines/Drains/Airways     None                 Time spent on the discharge of patient: 51 minutes         Liseth Ramirez NP  Department of Hospital Medicine  [facility] - Med S[facility]

## 2022-03-20 NOTE — PROGRESS NOTES
Pharmacokinetic Assessment Follow Up: IV Vancomycin    Vancomycin serum concentration assessment(s):    The trough level was drawn correctly and can be used to guide therapy at this time. The measurement is slightly above the desired definitive target range of 15 to 20 mcg/mL.    Vancomycin Regimen Plan:    Change regimen to Vancomycin 1750 mg IV every 24 hours with next serum trough concentration measured at 0530 prior to 3rd dose on 03/22    Drug levels (last 3 results):  Recent Labs   Lab Result Units 03/17/22  2100 03/20/22  0453   Vancomycin-Trough ug/mL 16.9 20.1       Pharmacy will continue to follow and monitor vancomycin.    Please contact pharmacy at extension [phone] for questions regarding this assessment.    Thank you for the consult,   Vanita Uriostegui       Patient brief summary:  Michela Arriaza is a 72 y.o. female initiated on antimicrobial therapy with IV Vancomycin for treatment of bone/joint infection    The patient's current regimen is vancomycin 1750 mg IV every 24 hours.     Drug Allergies:   Review of patient's allergies indicates:   Allergen Reactions    Iodine and iodide containing products     Levofloxacin Other (See Comments)       Actual Body Weight:   81.6 kg    Renal Function:   Estimated Creatinine Clearance: 75.5 mL/min (based on SCr of 0.7 mg/dL).,     Dialysis Method (if applicable):  N/A    CBC (last 72 hours):  No results for input(s): WHITE BLOOD CELL COUNT, HEMOGLOBIN, HEMATOCRIT, PLATELETS, GRAN%, LYMPH%, MONO%, EOSINOPHIL%, BASOPHIL%, DIFFERENTIAL METHOD in the last 72 hours.    Metabolic Panel (last 72 hours):  Recent Labs   Lab Result Units 03/17/22  2100 03/19/22  0718   Creatinine mg/dL 0.7 0.7       Vancomycin Administrations:  vancomycin given in the last 96 hours                     vancomycin 2 g in dextrose 5 % 500 mL IVPB ()  Restarted 03/19/22 0635      Restarted  0527      Restarted  0458     2,000 mg New Bag  0457      Restarted 03/18/22 0415     2,000 mg New Bag  03/16/22 2217                    Microbiologic Results:  Microbiology Results (last 7 days)       Procedure Component Value Units Date/Time    Blood culture x two cultures. Draw prior to antibiotics. [[id number]] Collected: 03/15/22 1917    Order Status: Completed Specimen: Blood from Peripheral, Antecubital, Right Updated: 03/19/22 0612     Blood Culture, Routine No Growth to date      No Growth to date      No Growth to date      No Growth to date    Narrative:      Aerobic and anaerobic    Blood culture x two cultures. Draw prior to antibiotics. [[id number]] Collected: 03/15/22 1931    Order Status: Completed Specimen: Blood from Peripheral, Antecubital, Left Updated: 03/19/22 0612     Blood Culture, Routine No Growth to date      No Growth to date      No Growth to date      No Growth to date    Narrative:      Aerobic and anaerobic

## 2022-03-20 NOTE — NURSING
Patient and family educated on plan of care. Discharge paper work faxed over to facility and given to family. Teach back completed

## 2022-03-20 NOTE — PLAN OF CARE
[facility] - Med Surg  Discharge Final Note    Primary Care Provider: Anatoliy Mayberry MD    Expected Discharge Date: 3/20/2022    Final Discharge Note (most recent)       Final Note - 03/20/22 1148          Final Note    Assessment Type Final Discharge Note     Anticipated Discharge Disposition Hospice/Medical Facility (P)         Post-Acute Status    Post-Acute Authorization Placement;Hospice (P)    [facility]    Post-Acute Placement Status Set-up Complete/Auth obtained (P)      Hospice Status Referrals Sent (P)    [facility]    Discharge Delays None known at this time (P)                      Important Message from Medicare             Contact Info       Anatoliy Mayberry MD   Specialty: Family Medicine   Relationship: PCP - General    [id number] [facility] [address]   Phone: [phone]       Next Steps: Follow up in 3 day(s)    Instructions: for hospital follow-up of decubitus infection            SW sent D/C orders to facility.   Nurse can call report to [phone].  Ask to be transferred to the nurse for patient. .    Yolanda Sun LMSW 3/20/2022 11:59 AM

## 2022-03-20 NOTE — NURSING
Nurse attempted to call facility to give report on patient. Facility explained that there is an emergency on that unit with the nurse and the nurse will be calling back.   explained that there will not be a problem if patient arrives after report is received.

## 2022-03-21 LAB
BACTERIA BLD CULT: NORMAL
BACTERIA BLD CULT: NORMAL